# Patient Record
Sex: FEMALE | Race: WHITE | NOT HISPANIC OR LATINO | Employment: STUDENT | URBAN - METROPOLITAN AREA
[De-identification: names, ages, dates, MRNs, and addresses within clinical notes are randomized per-mention and may not be internally consistent; named-entity substitution may affect disease eponyms.]

---

## 2019-05-16 ENCOUNTER — NURSE TRIAGE (OUTPATIENT)
Dept: NURSING | Facility: CLINIC | Age: 24
End: 2019-05-16

## 2019-05-16 ENCOUNTER — OFFICE VISIT (OUTPATIENT)
Dept: URGENT CARE | Facility: URGENT CARE | Age: 24
End: 2019-05-16
Payer: COMMERCIAL

## 2019-05-16 VITALS
OXYGEN SATURATION: 100 % | TEMPERATURE: 98.5 F | DIASTOLIC BLOOD PRESSURE: 79 MMHG | WEIGHT: 165 LBS | HEART RATE: 117 BPM | SYSTOLIC BLOOD PRESSURE: 126 MMHG

## 2019-05-16 DIAGNOSIS — H10.32 ACUTE CONJUNCTIVITIS OF LEFT EYE, UNSPECIFIED ACUTE CONJUNCTIVITIS TYPE: Primary | ICD-10-CM

## 2019-05-16 PROCEDURE — 99203 OFFICE O/P NEW LOW 30 MIN: CPT | Performed by: PHYSICIAN ASSISTANT

## 2019-05-16 RX ORDER — OLOPATADINE HYDROCHLORIDE 1 MG/ML
1 SOLUTION/ DROPS OPHTHALMIC 2 TIMES DAILY
Qty: 5 ML | Refills: 0 | Status: SHIPPED | OUTPATIENT
Start: 2019-05-16 | End: 2019-10-15

## 2019-05-16 RX ORDER — ERYTHROMYCIN 5 MG/G
0.5 OINTMENT OPHTHALMIC 3 TIMES DAILY
Qty: 1 G | Refills: 0 | Status: SHIPPED | OUTPATIENT
Start: 2019-05-16 | End: 2019-10-15

## 2019-05-16 NOTE — TELEPHONE ENCOUNTER
Patient calling stating [facility] was faxing information to [facility]. Stating they were in need of further insurance information.    Writer attempted to reach [facility] at  to clarify, phone line is not working at this time.     Rosmery Hamilton RN  [facility]

## 2019-05-16 NOTE — PATIENT INSTRUCTIONS
This does not look like a typical pinkeye infection.  However, the eyelash procedure is certainly a risk factor for infection.  We will try treating with antibiotic ointment erythromycin.   We will also prescribe allergy type eye drops to help with irritation - Patanol.    Follow up with an eye doctor if no improvement in 2 days.  Be seen immediately by an eye doctor if worsening in any way, including vision changes, swelling, drainage.

## 2019-05-16 NOTE — PROGRESS NOTES
"SUBJECTIVE:   Joao Morales is a 24 year old female presenting with a chief complaint of   Chief Complaint   Patient presents with     Urgent Care     Pt in clinic to have eval for left eye irritation and redness after have eyelash extensions.     Eye Problem     She got eyelash extension at a [facility] in hospitals yesterday at 2:30pm. She developed left eye redness right after the treatment. Redness has continued. It has not gotten any worse. Sometimes the eye is \"irritated and watery.\" No itching. No drainage or mattering. No vision changes. She feels like she is slightly swollen around the eye. No fever. She does wear glasses but no contact lenses.  Does has seasonal allergies but this feels different than her typical eye allergy symptoms.      ROS   See HPI    PMH:  No past medical history on file.  Patient Active Problem List   Diagnosis   (none) - all problems resolved or deleted       Current Medications:  Current Outpatient Medications   Medication Sig Dispense Refill     erythromycin (ROMYCIN) 5 MG/GM ophthalmic ointment Place 0.5 inches Into the left eye 3 times daily for 7 days 1 g 0     olopatadine (PATANOL) 0.1 % ophthalmic solution Place 1 drop Into the left eye 2 times daily 5 mL 0     MINOCYCLINE HCL For acne         Surgical History:  No past surgical history on file.    Family History:  No family history on file.    Social History:  Social History     Tobacco Use     Smoking status: Never Smoker     Smokeless tobacco: Never Used   Substance Use Topics     Alcohol use: Not on file            OBJECTIVE  /79   Pulse 117   Temp 98.5  F (36.9  C) (Oral)   Wt 74.8 kg (165 lb)   SpO2 100%     General: alert, appears well, NAD. Afebrile. Anxious initially.  Skin: no suspicious lesions or rashes.  HEENT: Normocephalic.   Eyes: ;eft eye: conjunctiva injected. No edema, mattering, or drainage. No lid edema or erythema. Right eye: conjunctiva clear.  Neck: supple, no " lymphadenopathy.  Respiratory: No distress.      Labs:  No results found for this or any previous visit (from the past 24 hour(s)).            ASSESSMENT/PLAN:    ICD-10-CM    1. Acute conjunctivitis of left eye, unspecified acute conjunctivitis type H10.32 erythromycin (ROMYCIN) 5 MG/GM ophthalmic ointment     olopatadine (PATANOL) 0.1 % ophthalmic solution           Medical Decision Making:    Serious Comorbid Conditions: none    Differential Diagnosis: bacterial vs allergic vs irritant conjunctivitis    Some risk for bacterial conjunctivitis given recent eyelash treatment, though appears less like this due to lack of drainage or mattering.   More likely allergic or irritant from the procedure.  No vision changes or eye pain- do not think this is corneal abrasion or ulcer.     Will treat with both erythromycin ointment as well as Patanol drops for irritation.  Recommend if no improvement in 2 days, she see ophthalmology- has an eye doctor.    At the end of the encounter, I discussed all available results, as well as the diagnosis and any associated medications. I discussed red flags for immediate return to clinic/ER, as well as indications for follow up. Refer to patient instructions below, which were all addressed with patient. Patient understood and agreed to plan. Patient was appropriate for discharge.      Patient Instructions   This does not look like a typical pinkeye infection.  However, the eyelash procedure is certainly a risk factor for infection.  We will try treating with antibiotic ointment erythromycin.   We will also prescribe allergy type eye drops to help with irritation - Patanol.    Follow up with an eye doctor if no improvement in 2 days.  Be seen immediately by an eye doctor if worsening in any way, including vision changes, swelling, drainage.              Jenn Atkins PA-C

## 2019-10-15 ENCOUNTER — OFFICE VISIT (OUTPATIENT)
Dept: FAMILY MEDICINE | Facility: CLINIC | Age: 24
End: 2019-10-15
Payer: COMMERCIAL

## 2019-10-15 VITALS
SYSTOLIC BLOOD PRESSURE: 112 MMHG | HEART RATE: 104 BPM | DIASTOLIC BLOOD PRESSURE: 72 MMHG | TEMPERATURE: 98.2 F | OXYGEN SATURATION: 100 %

## 2019-10-15 DIAGNOSIS — F90.0 ATTENTION DEFICIT HYPERACTIVITY DISORDER (ADHD), PREDOMINANTLY INATTENTIVE TYPE: ICD-10-CM

## 2019-10-15 DIAGNOSIS — F41.1 GAD (GENERALIZED ANXIETY DISORDER): Primary | ICD-10-CM

## 2019-10-15 DIAGNOSIS — F32.0 MILD MAJOR DEPRESSION (H): ICD-10-CM

## 2019-10-15 DIAGNOSIS — Z23 NEED FOR PROPHYLACTIC VACCINATION AND INOCULATION AGAINST INFLUENZA: ICD-10-CM

## 2019-10-15 PROCEDURE — 99202 OFFICE O/P NEW SF 15 MIN: CPT | Mod: 25 | Performed by: NURSE PRACTITIONER

## 2019-10-15 PROCEDURE — 90686 IIV4 VACC NO PRSV 0.5 ML IM: CPT | Performed by: NURSE PRACTITIONER

## 2019-10-15 PROCEDURE — 90471 IMMUNIZATION ADMIN: CPT | Performed by: NURSE PRACTITIONER

## 2019-10-15 RX ORDER — SERTRALINE HYDROCHLORIDE 100 MG/1
75 TABLET, FILM COATED ORAL DAILY
Refills: 0 | COMMUNITY
Start: 2019-09-30 | End: 2019-10-15

## 2019-10-15 SDOH — HEALTH STABILITY: MENTAL HEALTH: HOW OFTEN DO YOU HAVE A DRINK CONTAINING ALCOHOL?: NEVER

## 2019-10-15 ASSESSMENT — ANXIETY QUESTIONNAIRES
7. FEELING AFRAID AS IF SOMETHING AWFUL MIGHT HAPPEN: SEVERAL DAYS
5. BEING SO RESTLESS THAT IT IS HARD TO SIT STILL: SEVERAL DAYS
3. WORRYING TOO MUCH ABOUT DIFFERENT THINGS: SEVERAL DAYS
GAD7 TOTAL SCORE: 7
6. BECOMING EASILY ANNOYED OR IRRITABLE: SEVERAL DAYS
1. FEELING NERVOUS, ANXIOUS, OR ON EDGE: SEVERAL DAYS
2. NOT BEING ABLE TO STOP OR CONTROL WORRYING: SEVERAL DAYS

## 2019-10-15 ASSESSMENT — PATIENT HEALTH QUESTIONNAIRE - PHQ9
5. POOR APPETITE OR OVEREATING: SEVERAL DAYS
SUM OF ALL RESPONSES TO PHQ QUESTIONS 1-9: 5

## 2019-10-15 NOTE — PROGRESS NOTES
Subjective     Joao Morales is a 24 year old female who presents to clinic today for the following health issues:    HPI   Establish Care   She was in [place] going to school, but moved back due to anxiety and depression.  She started on Zoloft a few months ago and is currently taking 75 mg.  Mild lightheadedness, worse with dose increases.  She did increase from 50 to 100 mg and had side effects, so now is taking 75 mg.    She feels that it has been helping, still having a bit of anxiety.      She does have ADHD and was on Vyvanse in the past.  She has been off for the past year and was not having difficulty at work, but now in the past four months at her new job, is noticing more issues focusing and concentrating.  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             Reviewed and updated as needed this visit by Provider         Review of Systems   ROS COMP: CONSTITUTIONAL: NEGATIVE for fever, chills, change in weight  ENT/MOUTH: NEGATIVE for ear, mouth and throat problems  RESP: NEGATIVE for significant cough or SOB  CV: NEGATIVE for chest pain, palpitations or peripheral edema  GI: NEGATIVE for nausea, abdominal pain, heartburn, or change in bowel habits  MUSCULOSKELETAL: NEGATIVE for significant arthralgias or myalgia  NEURO: NEGATIVE for weakness, dizziness or paresthesias  PSYCHIATRIC: see HPI      Objective    /72   Pulse 104   Temp 98.2  F (36.8  C) (Oral)   LMP 10/09/2019 (Exact Date)   SpO2 100%   There is no height or weight on file to calculate BMI.  Physical Exam   GENERAL: healthy, alert and no distress  PSYCH: mentation appears normal, affect normal/bright; PHQ-9 score of 5; JACLYN-7 score of 7            Assessment & Plan     1. JACLYN (generalized anxiety disorder)  Improved, but not in full remission.  Will try increasing to 100 mg.   Follow up in one month.   - sertraline (ZOLOFT) 50 MG tablet; Take 2 tablets (100 mg) by mouth daily  Dispense: 60 tablet; Refill:  PRN    2. Mild major depression (H)  See above.   - sertraline (ZOLOFT) 50 MG tablet; Take 2 tablets (100 mg) by mouth daily  Dispense: 60 tablet; Refill: PRN    3. Attention deficit hyperactivity disorder (ADHD), predominantly inattentive type  Will first get her anxiety in full remission and if symptoms persist, will follow up and discuss restarting a medication for her ADHD.     4. Need for prophylactic vaccination and inoculation against influenza    - INFLUENZA VACCINE IM > 6 MONTHS VALENT IIV4 [35201]  - Vaccine Administration, Initial [92960]           No follow-ups on file.    Jeannie Gomes, NP  [facility]

## 2019-10-16 ASSESSMENT — ANXIETY QUESTIONNAIRES: GAD7 TOTAL SCORE: 7

## 2019-11-20 ENCOUNTER — OFFICE VISIT (OUTPATIENT)
Dept: FAMILY MEDICINE | Facility: CLINIC | Age: 24
End: 2019-11-20
Payer: COMMERCIAL

## 2019-11-20 VITALS
RESPIRATION RATE: 16 BRPM | WEIGHT: 151.8 LBS | TEMPERATURE: 98.2 F | DIASTOLIC BLOOD PRESSURE: 69 MMHG | SYSTOLIC BLOOD PRESSURE: 110 MMHG | HEART RATE: 77 BPM | OXYGEN SATURATION: 97 %

## 2019-11-20 DIAGNOSIS — R42 DIZZINESS: ICD-10-CM

## 2019-11-20 DIAGNOSIS — F90.9 ATTENTION DEFICIT HYPERACTIVITY DISORDER (ADHD), UNSPECIFIED ADHD TYPE: ICD-10-CM

## 2019-11-20 DIAGNOSIS — F41.1 GAD (GENERALIZED ANXIETY DISORDER): ICD-10-CM

## 2019-11-20 DIAGNOSIS — F32.0 MILD MAJOR DEPRESSION (H): ICD-10-CM

## 2019-11-20 DIAGNOSIS — Z83.2 FAMILY HISTORY OF ANEMIA: Primary | ICD-10-CM

## 2019-11-20 LAB
ERYTHROCYTE [DISTWIDTH] IN BLOOD BY AUTOMATED COUNT: 12.2 % (ref 10–15)
HCT VFR BLD AUTO: 39.8 % (ref 35–47)
HGB BLD-MCNC: 13.2 G/DL (ref 11.7–15.7)
MCH RBC QN AUTO: 29 PG (ref 26.5–33)
MCHC RBC AUTO-ENTMCNC: 33.2 G/DL (ref 31.5–36.5)
MCV RBC AUTO: 88 FL (ref 78–100)
PLATELET # BLD AUTO: 231 10E9/L (ref 150–450)
RBC # BLD AUTO: 4.55 10E12/L (ref 3.8–5.2)
WBC # BLD AUTO: 6.2 10E9/L (ref 4–11)

## 2019-11-20 PROCEDURE — 36415 COLL VENOUS BLD VENIPUNCTURE: CPT | Performed by: FAMILY MEDICINE

## 2019-11-20 PROCEDURE — 85027 COMPLETE CBC AUTOMATED: CPT | Performed by: FAMILY MEDICINE

## 2019-11-20 PROCEDURE — 99214 OFFICE O/P EST MOD 30 MIN: CPT | Performed by: FAMILY MEDICINE

## 2019-11-20 RX ORDER — LISDEXAMFETAMINE DIMESYLATE 10 MG/1
10 CAPSULE ORAL DAILY
Qty: 30 CAPSULE | Refills: 0 | Status: SHIPPED | OUTPATIENT
Start: 2019-11-20 | End: 2019-12-20

## 2019-11-20 RX ORDER — LISDEXAMFETAMINE DIMESYLATE 10 MG/1
10 CAPSULE ORAL DAILY
Qty: 30 CAPSULE | Refills: 0 | Status: SHIPPED | OUTPATIENT
Start: 2019-12-21 | End: 2020-01-20

## 2019-11-20 ASSESSMENT — ANXIETY QUESTIONNAIRES
3. WORRYING TOO MUCH ABOUT DIFFERENT THINGS: SEVERAL DAYS
6. BECOMING EASILY ANNOYED OR IRRITABLE: SEVERAL DAYS
GAD7 TOTAL SCORE: 7
4. TROUBLE RELAXING: SEVERAL DAYS
7. FEELING AFRAID AS IF SOMETHING AWFUL MIGHT HAPPEN: SEVERAL DAYS
7. FEELING AFRAID AS IF SOMETHING AWFUL MIGHT HAPPEN: SEVERAL DAYS
5. BEING SO RESTLESS THAT IT IS HARD TO SIT STILL: SEVERAL DAYS
GAD7 TOTAL SCORE: 7
2. NOT BEING ABLE TO STOP OR CONTROL WORRYING: SEVERAL DAYS
1. FEELING NERVOUS, ANXIOUS, OR ON EDGE: SEVERAL DAYS
GAD7 TOTAL SCORE: 7

## 2019-11-20 ASSESSMENT — PATIENT HEALTH QUESTIONNAIRE - PHQ9
10. IF YOU CHECKED OFF ANY PROBLEMS, HOW DIFFICULT HAVE THESE PROBLEMS MADE IT FOR YOU TO DO YOUR WORK, TAKE CARE OF THINGS AT HOME, OR GET ALONG WITH OTHER PEOPLE: SOMEWHAT DIFFICULT
SUM OF ALL RESPONSES TO PHQ QUESTIONS 1-9: 8
SUM OF ALL RESPONSES TO PHQ QUESTIONS 1-9: 8

## 2019-11-20 NOTE — PROGRESS NOTES
Answers for HPI/ROS submitted by the patient on 11/20/2019   If you checked off any problems, how difficult have these problems made it for you to do your work, take care of things at home, or get along with other people?: Somewhat difficult  PHQ9 TOTAL SCORE: 8  JACLYN 7 TOTAL SCORE: 7

## 2019-11-20 NOTE — PATIENT INSTRUCTIONS
Lab work should be back within next couple days    If your dizziness comes on try the maneuvers as directed on that handout. Okay to flip the directions the other way. This may take 2-3 times of doing these maneuvers consecutively to help with your symptoms    If your symptoms get worse please reach out to us to discuss  --    Decrease your zoloft dose from 100mg to 75mg     --  Vyvanse 10mg in the morning on the days you only need it, try to avoid using on the weekends      Follow-up appointment with myself or Estella Gomes in 2 months

## 2019-11-20 NOTE — PROGRESS NOTES
Subjective:   Joao Morales is a 24 year old female who presents for   Chief Complaint   Patient presents with     Dizziness     off and on X 4 weeks      Feeling off/unstable sometimes possible blurry vision. She had an episode yesterday for about 20 minute.   She is not diabetic. Doesn't typically follow certain activities. No room-spinning.   She was told a month ago that she may BPPV and was given meclizine, this hasn't helped much. She gets headaches occasionally about 1-2x/week but no migraines. No episodes of passing out. She drinks approximately 3-4 bottles of fluids a day. No weakness/numbnes of extremities. Sitting down can improve her symptoms when they start, doesn't take too long to resolve. She has not had any weakness of the body. NO hx of concussions or injuries.     No recent illnesses. No foreign travel.     She did have an adjustment to her zoloft a couple months ago. Her current dose is 100mg after a recent upwards titration from 50mg -> 75mg-> 100mg. At this time the anxiety is more of a concern than her mood/depression. Compared to a month ago she feels that her overall mood is improved from previous.     Of note she did become vegan recently and there is a family history of anemia.     3) Vyvanse - was last on this for ADHD about 1.5 year ago and was prescribed at [facility] in [place] . 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, in a supportive relationship with her female partner, works 5 days a week for her dad (not  [place] stressful job)     FH: none with vertigo    PHQ score of 8, JACLYN score of 7      Patient Active Problem List    Diagnosis Date Noted     Attention deficit hyperactivity disorder (ADHD), predominantly inattentive type 10/15/2019     Priority: Medium     JACLYN (generalized anxiety disorder) 10/15/2019     Priority: Medium     Mild major depression (H) 10/15/2019     Priority: Medium     Current Outpatient Medications   Medication      lisdexamfetamine (VYVANSE) 10 MG capsule     [START ON 12/21/2019] lisdexamfetamine (VYVANSE) 10 MG capsule     sertraline (ZOLOFT) 50 MG tablet     No current facility-administered medications for this visit.      ROS:  As above per HPI    Objective:   /69   Pulse 77   Temp 98.2  F (36.8  C)   Resp 16   Wt 68.9 kg (151 lb 12.8 oz)   SpO2 97% , There is no height or weight on file to calculate BMI.  Gen:  NAD, well-nourished, sitting in chair comfortably  HEENT: EOMI, sclera anicteric, Head normocephalic, ; nares patent; mosit mucous membranes  Neck: trachea midline, no thyromegaly  CV:  Hemodynamically stable, RRR  Pulm:  no increased work of breathing , CTAB, no wheezes/rales/rhonchi   Extrem: no cyanosis, edema or clubbing  Skin: no obvious rashes or abnormalities  Psych: Euthymic, linear thoughts, normal rate of speech  Neuro:  laying down onto right side rapidly may have brought on room spinning towards her left side but no obvious nystagmus on exam. Rombergs negative   MSK: no muscle wasting  Gait: normal/steady    No results found for any visits on 11/20/19.    Assessment & Plan:   Joao Morales, 24 year old female who presents with:  Family history of anemia  Screen hemoglobin today to rule out as the cause of her symptoms.   - CBC with platelets    Mild major depression (H)  JACLYN (generalized anxiety disorder)  Dizziness  Will adjust from 100mg to 75mg to see if this helps with her symptoms of dizziness. She may have a component of BPPV given her symptoms are some times brought on by rapid head movement  - sertraline (ZOLOFT) 50 MG tablet  Dispense: 45 tablet; Refill: 2    Attention deficit hyperactivity disorder (ADHD), unspecified ADHD type  Patient struggling at work, requesting to go back on medication but at a lower dose from previous. Adderrall in the past caused teeth grinding (possible culprit for her root canal) and her mood was 'off' while on this. I provided her two months of 10mg  vyvanse and recommending avoiding on the weekends for her 'drug holidays'  - lisdexamfetamine (VYVANSE) 10 MG capsule  Dispense: 30 capsule; Refill: 0  - lisdexamfetamine (VYVANSE) 10 MG capsule  Dispense: 30 capsule; Refill: 0    She is to return in 2 months for follow-up on her mood/ADHD , sooner if symptoms worsen with any of the above concerns.       Dylan Padgett MD   [facility]    The use of Dragon/[employer]nor[employer] services may have been used to construct the content in this note; any grammatical or spelling errors are non-intentional. Please contact the author of this note directly if you are in need of any clarification.

## 2019-11-21 ASSESSMENT — ANXIETY QUESTIONNAIRES: GAD7 TOTAL SCORE: 7

## 2019-11-21 ASSESSMENT — PATIENT HEALTH QUESTIONNAIRE - PHQ9: SUM OF ALL RESPONSES TO PHQ QUESTIONS 1-9: 8

## 2019-12-13 ENCOUNTER — MYC REFILL (OUTPATIENT)
Dept: FAMILY MEDICINE | Facility: CLINIC | Age: 24
End: 2019-12-13

## 2019-12-13 DIAGNOSIS — F41.1 GAD (GENERALIZED ANXIETY DISORDER): ICD-10-CM

## 2019-12-13 DIAGNOSIS — F32.0 MILD MAJOR DEPRESSION (H): ICD-10-CM

## 2019-12-14 ENCOUNTER — TELEPHONE (OUTPATIENT)
Dept: FAMILY MEDICINE | Facility: CLINIC | Age: 24
End: 2019-12-14

## 2019-12-15 NOTE — TELEPHONE ENCOUNTER
Signed Prescriptions:                        Disp   Refills    sertraline (ZOLOFT) 50 MG tablet           60 tab*1        Sig: Take 2 tablets (100 mg) by mouth daily  Authorizing Provider: DANY MCKINLEY

## 2020-03-01 ENCOUNTER — HEALTH MAINTENANCE LETTER (OUTPATIENT)
Age: 25
End: 2020-03-01

## 2020-05-18 VITALS — WEIGHT: 150 LBS | BODY MASS INDEX: 23.54 KG/M2 | HEIGHT: 67 IN

## 2020-05-18 ASSESSMENT — MIFFLIN-ST. JEOR: SCORE: 1463.03

## 2020-05-18 NOTE — PROGRESS NOTES
"Joao Morales is a 25 year old female who is being evaluated via a billable video visit.      The patient has been notified of following:     \"This video visit will be conducted via a call between you and your physician/provider. We have found that certain health care needs can be provided without the need for an in-person physical exam.  This service lets us provide the care you need with a video conversation.  If a prescription is necessary we can send it directly to your pharmacy.  If lab work is needed we can place an order for that and you can then stop by our lab to have the test done at a later time.    Video visits are billed at different rates depending on your insurance coverage.  Please reach out to your insurance provider with any questions.    If during the course of the call the physician/provider feels a video visit is not appropriate, you will not be charged for this service.\"    Patient has given verbal consent for Video visit? Yes    How would you like to obtain your AVS? [place]    Patient would like the video invitation sent by: Send to e-mail at: [email or web address]    Will anyone else be joining your video visit? No    Subjective     Joao Morales is a 25 year old female who presents today via video visit for the following health issues:    HPI     Allergies -     Onset: always had seasonal allergies      Description:        Nasal congestion: YES         Sneezing: YES        Cough: no        Red, itchy eyes: YES        Wheezing or History of Asthma: no    Intensity: mild    Frequency:        Is it seasonal: in the fall and in the summer          Accompanying Signs & Symptoms:        Fevers: no          Rash: no        Fatigue: no         Sinus/facial pain: YES    Precipitating and/or Alleviating factors:    Symptoms worsen when exposed to: trees, grasses, weeds and outdoors   Has allergy testing been done: no    Therapies tried and outcome: Claritin with  no relief    Would like " medication for congestion            Video Start Time: 7:46    She recently moved back to [place] and would like to have me take over prescribing her Prozac.  She has been on this medication for the past two month.  It is working well for her depression.  She has noticed some difficulty concentrating and fatigue.  She is now working at home, which is a change for her.    She does have ADHD and was on Vyvanse in the past.         Reviewed and updated as needed this visit by Provider         Review of Systems   CONSTITUTIONAL: NEGATIVE for fever, chills, change in weight  ENT/MOUTH: see HPI  RESP: NEGATIVE for significant cough or SOB  CV: NEGATIVE for chest pain, palpitations or peripheral edema  GI: NEGATIVE for nausea, abdominal pain, heartburn, or change in bowel habits  MUSCULOSKELETAL: NEGATIVE for significant arthralgias or myalgia  NEURO: NEGATIVE for weakness, dizziness or paresthesias  PSYCHIATRIC: see HPI      Objective    There were no vitals taken for this visit.  There is no height or weight on file to calculate BMI.  Physical Exam     GENERAL: Healthy, alert and no distress  EYES: Eyes grossly normal to inspection.  No discharge or erythema, or obvious scleral/conjunctival abnormalities.  RESP: No audible wheeze, cough, or visible cyanosis.  No visible retractions or increased work of breathing.    SKIN: Visible skin clear. No significant rash, abnormal pigmentation or lesions.  NEURO: Cranial nerves grossly intact.  Mentation and speech appropriate for age.  PSYCH: Mentation appears normal, affect normal/bright, judgement and insight intact, normal speech and appearance well-groomed.              Assessment & Plan     (F32.0) Mild major depression (H)  (primary encounter diagnosis)  Comment:   Plan: FLUoxetine (PROZAC) 10 MG capsule        Discussed that her difficulty concentrating and fatigue may be depression symptoms or could be her ADHD.  She declines going back on Vyvanse at this time.  Will  increase her Prozac to 30 mg.  Follow up in one month if needed.      (F41.1) JACLYN (generalized anxiety disorder)  Comment:   Plan: FLUoxetine (PROZAC) 10 MG capsule        See above.     (J30.2) Seasonal allergic rhinitis, unspecified trigger  Comment:   Plan: fluticasone (FLONASE) 50 MCG/ACT nasal spray        Will add Flonase.  Discussed the use and indication of this medication as well as potential side effects.  If this is not helpful, she will follow up and will consider adding Singulair.               No follow-ups on file.    Jeannie Gomes NP  [facility]      Video-Visit Details    Type of service:  Video Visit    Video End Time:8:01 AM    Originating Location (pt. Location): Home    Distant Location (provider location):  [facility]     Platform used for Video Visit: [email or web address]    No follow-ups on file.       Jeannie Gomes NP

## 2020-05-19 ENCOUNTER — VIRTUAL VISIT (OUTPATIENT)
Dept: FAMILY MEDICINE | Facility: CLINIC | Age: 25
End: 2020-05-19
Payer: COMMERCIAL

## 2020-05-19 DIAGNOSIS — F41.1 GAD (GENERALIZED ANXIETY DISORDER): ICD-10-CM

## 2020-05-19 DIAGNOSIS — F32.0 MILD MAJOR DEPRESSION (H): Primary | ICD-10-CM

## 2020-05-19 DIAGNOSIS — J30.2 SEASONAL ALLERGIC RHINITIS, UNSPECIFIED TRIGGER: ICD-10-CM

## 2020-05-19 PROCEDURE — 99214 OFFICE O/P EST MOD 30 MIN: CPT | Mod: GT | Performed by: NURSE PRACTITIONER

## 2020-05-19 RX ORDER — FLUOXETINE 10 MG/1
30 CAPSULE ORAL DAILY
Qty: 90 CAPSULE | Refills: 5 | Status: SHIPPED | OUTPATIENT
Start: 2020-05-19 | End: 2020-12-14

## 2020-05-19 RX ORDER — FLUTICASONE PROPIONATE 50 MCG
2 SPRAY, SUSPENSION (ML) NASAL DAILY
Qty: 16 G | Status: SHIPPED | OUTPATIENT
Start: 2020-05-19 | End: 2021-03-04

## 2020-07-21 ENCOUNTER — VIRTUAL VISIT (OUTPATIENT)
Dept: FAMILY MEDICINE | Facility: CLINIC | Age: 25
End: 2020-07-21
Payer: COMMERCIAL

## 2020-07-21 DIAGNOSIS — F32.0 MILD MAJOR DEPRESSION (H): Primary | ICD-10-CM

## 2020-07-21 DIAGNOSIS — F41.1 GAD (GENERALIZED ANXIETY DISORDER): ICD-10-CM

## 2020-07-21 PROCEDURE — 99213 OFFICE O/P EST LOW 20 MIN: CPT | Mod: GT | Performed by: NURSE PRACTITIONER

## 2020-07-21 RX ORDER — FLUOXETINE 40 MG/1
40 CAPSULE ORAL DAILY
Qty: 90 CAPSULE | Refills: 1 | Status: SHIPPED | OUTPATIENT
Start: 2020-07-21 | End: 2020-10-17

## 2020-07-21 ASSESSMENT — ANXIETY QUESTIONNAIRES
5. BEING SO RESTLESS THAT IT IS HARD TO SIT STILL: SEVERAL DAYS
1. FEELING NERVOUS, ANXIOUS, OR ON EDGE: SEVERAL DAYS
7. FEELING AFRAID AS IF SOMETHING AWFUL MIGHT HAPPEN: NOT AT ALL
6. BECOMING EASILY ANNOYED OR IRRITABLE: NOT AT ALL
2. NOT BEING ABLE TO STOP OR CONTROL WORRYING: NOT AT ALL
3. WORRYING TOO MUCH ABOUT DIFFERENT THINGS: SEVERAL DAYS
GAD7 TOTAL SCORE: 4

## 2020-07-21 ASSESSMENT — PATIENT HEALTH QUESTIONNAIRE - PHQ9
5. POOR APPETITE OR OVEREATING: SEVERAL DAYS
SUM OF ALL RESPONSES TO PHQ QUESTIONS 1-9: 4

## 2020-07-21 NOTE — PROGRESS NOTES
"Joao Morales is a 25 year old female who is being evaluated via a billable video visit.      The patient has been notified of following:     \"This video visit will be conducted via a call between you and your physician/provider. We have found that certain health care needs can be provided without the need for an in-person physical exam.  This service lets us provide the care you need with a video conversation.  If a prescription is necessary we can send it directly to your pharmacy.  If lab work is needed we can place an order for that and you can then stop by our lab to have the test done at a later time.    Video visits are billed at different rates depending on your insurance coverage.  Please reach out to your insurance provider with any questions.    If during the course of the call the physician/provider feels a video visit is not appropriate, you will not be charged for this service.\"    Patient has given verbal consent for Video visit? Yes  How would you like to obtain your AVS? MyChart  If you are dropped from the video visit, the video invite should be resent to: [email or web address]   Will anyone else be joining your video visit? No    Subjective     Joao Morales is a 25 year old female who presents today via video visit for the following health issues:    HPI    Depression and Anxiety Follow-Up    How are you doing with your depression since your last visit? Improved since increasing Prozac to 40 MG    How are you doing with your anxiety since your last visit?  Improved     Are you having other symptoms that might be associated with depression or anxiety? No    Have you had a significant life event? No     Do you have any concerns with your use of alcohol or other drugs? No    Social History     Tobacco Use     Smoking status: Never Smoker     Smokeless tobacco: Never Used   Substance Use Topics     Alcohol use: Never     Frequency: Never     Drug use: Never     PHQ 10/15/2019 11/20/2019 7/21/2020   PHQ-9 Total " Score 5 8 4   Q9: Thoughts of better off dead/self-harm past 2 weeks Not at all Not at all Not at all     JACLYN-7 SCORE 10/15/2019 11/20/2019 7/21/2020   Total Score - 7 (mild anxiety) -   Total Score 7 7 4     Last PHQ-9 7/21/2020   1.  Little interest or pleasure in doing things 1   2.  Feeling down, depressed, or hopeless 0   3.  Trouble falling or staying asleep, or sleeping too much 0   4.  Feeling tired or having little energy 1   5.  Poor appetite or overeating 0   6.  Feeling bad about yourself 0   7.  Trouble concentrating 1   8.  Moving slowly or restless 1   Q9: Thoughts of better off dead/self-harm past 2 weeks 0   PHQ-9 Total Score 4     JACLYN-7  7/21/2020   1. Feeling nervous, anxious, or on edge 1   2. Not being able to stop or control worrying 0   3. Worrying too much about different things 1   4. Trouble relaxing 1   5. Being so restless that it is hard to sit still 1   6. Becoming easily annoyed or irritable 0   7. Feeling afraid, as if something awful might happen 0   JACLYN-7 Total Score 4       Suicide Assessment Five-step Evaluation and Treatment (SAFE-T)      How many servings of fruits and vegetables do you eat daily?  2-3    On average, how many sweetened beverages do you drink each day (Examples: soda, juice, sweet tea, etc.  Do NOT count diet or artificially sweetened beverages)? 0    How many days per week do you exercise enough to make your heart beat faster? Daily     How many minutes a day do you exercise enough to make your heart beat faster? 30 minutes     How many days per week do you miss taking your medication? 0         Video Start Time: 12:45    Her dose of fluoxetine was increased to 30 mg in May and increased to 40 mg 2 weeks ago.  She feels that this dose is helpful in controlling her depression and anxiety symptoms.  She denies any medication side effects.          Reviewed and updated as needed this visit by Provider         Review of Systems   CONSTITUTIONAL: NEGATIVE for fever,  chills, change in weight  ENT/MOUTH: NEGATIVE for ear, mouth and throat problems  RESP: NEGATIVE for significant cough or SOB  CV: NEGATIVE for chest pain, palpitations or peripheral edema  GI: NEGATIVE for nausea, abdominal pain, heartburn, or change in bowel habits  MUSCULOSKELETAL: NEGATIVE for significant arthralgias or myalgia  NEURO: NEGATIVE for weakness, dizziness or paresthesias  PSYCHIATRIC: NEGATIVE for changes in mood or affect      Objective             Physical Exam     GENERAL: Healthy, alert and no distress  EYES: Eyes grossly normal to inspection.  No discharge or erythema, or obvious scleral/conjunctival abnormalities.  RESP: No audible wheeze, cough, or visible cyanosis.  No visible retractions or increased work of breathing.    SKIN: Visible skin clear. No significant rash, abnormal pigmentation or lesions.  NEURO: Cranial nerves grossly intact.  Mentation and speech appropriate for age.  PSYCH: Mentation appears normal, affect normal/bright, judgement and insight intact, normal speech and appearance well-groomed.      Diagnostic Test Results:  Labs reviewed in Epic        Assessment & Plan     (F32.0) Mild major depression (H)  (primary encounter diagnosis)  Comment: in complete remisison  Plan: FLUoxetine (PROZAC) 40 MG capsule        The current medical regimen is effective;  continue present plan and medications.   Follow up in 6 months or sooner PRN.     (F41.1) JACLYN (generalized anxiety disorder)  Comment: in complete remission  Plan: FLUoxetine (PROZAC) 40 MG capsule        See above.              No follow-ups on file.    Jeannie Gomes NP  [facility]      Video-Visit Details    Type of service:  Video Visit    Video End Time:12:55 PM    Originating Location (pt. Location): Home    Distant Location (provider location):  [facility]     Platform used for Video Visit: [employer]    No follow-ups on file.       Jeannie Gomes NP

## 2020-07-21 NOTE — PATIENT INSTRUCTIONS
My Depression Action Plan  Name: Joao Morales   Date of Birth 1995  Date: 7/21/2020    My doctor: No Ref-Primary, Physician   My clinic: [facility] [address] [place] [id number] [phone]          GREEN    ZONE   Good Control    What it looks like:     Things are going generally well. You have normal ups and downs. You may even feel depressed from time to time, but bad moods usually last less than a day.   What you need to do:  1. Continue to care for yourself (see self care plan)  2. Check your depression survival kit and update it as needed  3. Follow your physician s recommendations including any medication.  4. Do not stop taking medication unless you consult with your physician first.           YELLOW         ZONE Getting Worse    What it looks like:     Depression is starting to interfere with your life.     It may be hard to get out of bed; you may be starting to isolate yourself from others.    Symptoms of depression are starting to last most all day and this has happened for several days.     You may have suicidal thoughts but they are not constant.   What you need to do:     1. Call your care team. Your response to treatment will improve if you keep your care team informed of your progress. Yellow periods are signs an adjustment may need to be made.     2. Continue your self-care.  Just get dressed and ready for the day.  Don't give yourself time to talk yourself out of it.    3. Talk to someone in your support network.    4. Open up your Depression Self-Care Plan/Wellness Kit.           RED    ZONE Medical Alert - Get Help    What it looks like:     Depression is seriously interfering with your life.     You may experience these or other symptoms: You can t get out of bed most days, can t work or engage in other necessary activities, you have trouble taking care of basic hygiene, or basic responsibilities, thoughts of suicide or death that will not go away,  self-injurious behavior.     What you need to do:  1. Call your care team and request a same-day appointment. If they are not available (weekends or after hours) call your local crisis line, emergency room or 911.            Depression Self-Care Plan / Wellness Kit    Self-Care for Depression  Here s the deal. Your body and mind are really not as separate as most people think.  What you do and think affects how you feel and how you feel influences what you do and think. This means if you do things that people who feel good do, it will help you feel better.  Sometimes this is all it takes.  There is also a place for medication and therapy depending on how severe your depression is, so be sure to consult with your medical provider and/ or Behavioral Health Consultant if your symptoms are worsening or not improving.     In order to better manage my stress, I will:    Exercise  Get some form of exercise, every day. This will help reduce pain and release endorphins, the  feel good  chemicals in your brain. This is almost as good as taking antidepressants!  This is not the same as joining a gym and then never going! (they count on that by the way ) It can be as simple as just going for a walk or doing some gardening, anything that will get you moving.      Hygiene   Maintain good hygiene (get out of bed in the morning, make your bed, brush your teeth, take a shower, and get dressed like you were going to work, even if you are unemployed).  If your clothes don't fit try to get ones that do.    Diet  Strive to eat foods that are good for me, drink plenty of water, and avoid excessive sugar, caffeine, alcohol, and other mood-altering substances.  Some foods that are helpful in depression are: complex carbohydrates, B vitamins, flaxseed, fish or fish oil, fresh fruits and vegetables.    Psychotherapy  Agree to participate in Individual Therapy (if recommended).    Medication  If prescribed medications, I agree to take them.   Missing doses can result in serious side effects.  I understand that drinking alcohol, or other illicit drug use, may cause potential side effects.  I will not stop my medication abruptly without first discussing it with my provider.    Staying Connected With Others  Stay in touch with my friends, family members, and my primary care provider/team.    Use your imagination  Be creative.  We all have a creative side; it doesn t matter if it s oil painting, sand castles, or mud pies! This will also kick up the endorphins.    Witness Beauty  (AKA stop and smell the roses) Take a look outside, even in mid-winter. Notice colors, textures. Watch the squirrels and birds.     Service to others  Be of service to others.  There is always someone else in need.  By helping others we can  get out of ourselves  and remember the really important things.  This also provides opportunities for practicing all the other parts of the program.    Humor  Laugh and be silly!  Adjust your TV habits for less news and crime-drama and more comedy.    Control your stress  Try breathing deep, massage therapy, biofeedback, and meditation. Find time to relax each day.     Crisis Text Line  http://www.crisistextline.org    The Crisis Text Line serves anyone, in any type of crisis, providing access to free, 24/7 support and information via the medium people already use and trust:    Here's how it works:  1.  Text 362-016 from anywhere in the [place], anytime, about any type of crisis.  2.  A live, trained Crisis Counselor receives the text and responds quickly.  3.  The volunteer Crisis Counselor will help you move from a 'hot moment to a cool moment'.    My support system    Clinic Contact:  Phone number:    Contact 1:  Phone number:    Contact 2:  Phone number:    Congregational/:  Phone number:    Therapist:  Phone number:    Local crisis center:    Phone number:    Other community support:  Phone number:

## 2020-07-21 NOTE — NURSING NOTE
Notified pt she is overdue for annual physical with pap smear.   Pt notes she has never had pap smear before. Advised pt to schedule this in the future and to discuss any questions with provider today.     Monica Vee MA

## 2020-07-22 ASSESSMENT — ANXIETY QUESTIONNAIRES: GAD7 TOTAL SCORE: 4

## 2020-10-17 ENCOUNTER — MYC REFILL (OUTPATIENT)
Dept: FAMILY MEDICINE | Facility: CLINIC | Age: 25
End: 2020-10-17

## 2020-10-17 DIAGNOSIS — F41.1 GAD (GENERALIZED ANXIETY DISORDER): ICD-10-CM

## 2020-10-17 DIAGNOSIS — F32.0 MILD MAJOR DEPRESSION (H): ICD-10-CM

## 2020-10-21 RX ORDER — FLUOXETINE 40 MG/1
40 CAPSULE ORAL DAILY
Qty: 90 CAPSULE | Refills: 0 | Status: SHIPPED | OUTPATIENT
Start: 2020-10-21 | End: 2020-12-14

## 2020-12-14 ENCOUNTER — OFFICE VISIT (OUTPATIENT)
Dept: FAMILY MEDICINE | Facility: CLINIC | Age: 25
End: 2020-12-14
Payer: COMMERCIAL

## 2020-12-14 ENCOUNTER — HEALTH MAINTENANCE LETTER (OUTPATIENT)
Age: 25
End: 2020-12-14

## 2020-12-14 VITALS
RESPIRATION RATE: 16 BRPM | BODY MASS INDEX: 22.6 KG/M2 | HEART RATE: 90 BPM | SYSTOLIC BLOOD PRESSURE: 123 MMHG | WEIGHT: 144 LBS | DIASTOLIC BLOOD PRESSURE: 61 MMHG | HEIGHT: 67 IN | OXYGEN SATURATION: 99 % | TEMPERATURE: 99.3 F

## 2020-12-14 DIAGNOSIS — F32.0 MILD MAJOR DEPRESSION (H): ICD-10-CM

## 2020-12-14 DIAGNOSIS — F41.1 GAD (GENERALIZED ANXIETY DISORDER): ICD-10-CM

## 2020-12-14 DIAGNOSIS — R55 VASOVAGAL SYNCOPE: Primary | ICD-10-CM

## 2020-12-14 PROCEDURE — 99214 OFFICE O/P EST MOD 30 MIN: CPT | Performed by: NURSE PRACTITIONER

## 2020-12-14 RX ORDER — FLUOXETINE 40 MG/1
40 CAPSULE ORAL DAILY
Qty: 90 CAPSULE | Refills: 1 | Status: SHIPPED | OUTPATIENT
Start: 2020-12-14 | End: 2021-05-12

## 2020-12-14 ASSESSMENT — MIFFLIN-ST. JEOR: SCORE: 1439.77

## 2020-12-14 NOTE — PATIENT INSTRUCTIONS
Patient Education     Understanding Vasovagal Syncope  Vasovagal syncope is fainting caused by a complex nerve and blood vessel reaction in the body. It's the result of an abnormal reflex in the body and is often called reflex syncope. It s the most common cause of fainting. Unlike other causes of fainting, it s not a sign of a problem with the heart or brain   How to say it  [id number]-lisall  SINK-o-pee  How vasovagal syncope happens  Many nerves connect with your heart and blood vessels. These nerves help control the speed and force of your heartbeat. They also regulate blood pressure. They control whether your blood vessels should be more open or more closed.   Usually these nerves work together so you always get enough blood to your brain. In certain cases, these nerves may give a wrong signal or be slow to respond to input received from the body. This may cause your blood vessels to open wide or cause them not to get more narrow when they need to. At the same time, your heartbeat may slow down. Blood can start to pool in your legs, and not enough of it may reach the brain. If that happens, you may briefly lose consciousness. When you lie down or fall down, blood flow to the brain resumes.   What causes vasovagal syncope?  Many triggers can cause vasovagal syncope, such as:    Standing for long periods    Too much heat    Intense emotion, such as fear    Intense pain    The sight of blood or a needle    Exercising for a long time  Older adults may have additional triggers, such as:    Urinating    Swallowing    Coughing    Having a bowel movement  Symptoms of vasovagal syncope  Fainting is the main symptom of vasovagal syncope. You may have symptoms before fainting such as:     Nausea    Warm, flushed feeling    Face that turns pale    Sweaty palms    Feeling dizzy    Blurred vision  If you lie down and elevate your legs at the first sign of these symptoms, you will often be able to prevent fainting. Not  everyone notices symptoms before fainting, however.   When a person does faint, lying down restores blood flow to the brain. Consciousness should return fairly quickly. You might not feel normal for a little while after you faint. You might feel depressed or fatigued for a short time. You may even feel nauseous afterwards and vomit.   Some people have only 1 or 2 episodes of vasovagal syncope in their life. For others, it happens more often and with no warning.   Diagnosing vasovagal syncope  Your healthcare provider will ask about your health history and your symptoms. He or she will give you a physical exam. Your blood pressure may be measured while lying down, seated, and standing. You may also have an electrocardiogram (ECG). This is a simple test that looks at the heart s rhythm.   You may be checked for other possible causes of fainting. You may have other tests such as:     Continuous portable ECG monitoring, to look at heart rhythms over time, such as with a Holter or event monitor    Echocardiogram, to look at the blood flow in the heart and the heart s motion    Exercise stress testing, to see how your heart does during exercise    Blood tests to check for signs of disease  If these tests are normal, you may have a tilt table test. For this test, you lie down on a platform with straps around you to keep you from sustaining an injury. Your heart rate and blood pressure are measured while you are lying down. The platform is then tilted upright. Your heart rate and blood pressure are measured again. If you have vasovagal syncope, you may faint during the upward tilt. Sometimes medicines that increase heart rate and the force of heart contractions or decrease blood pressure are used to try and provoke a syncopal episode.   There are many causes of syncope. Some causes are not dangerous. In older persons, unexplained syncope can be a sign of a serious infection or a heart attack. Call 911 or seek medical  attention right away to be evaluated, especially if there has been a fall and injury with the syncope. You should not drive yourself to the hospital or emergency department after a syncopal episode for the safety of yourself or other drivers and passengers. Have someone drive you. Your provider may restrict your driving until the cause of the syncope is better understood and to make sure the syncope does not become chronic or if it is unpredictable.   [employer] last reviewed this educational content on 5/1/2019 2000-2020 [employer] [address] [place] [address]. All rights reserved. This information is not intended as a substitute for professional medical care. Always follow your healthcare professional's instructions.

## 2020-12-14 NOTE — PROGRESS NOTES
"Subjective     Joao Morales is a 25 year old female who presents to clinic today for the following health issues:    HPI         Syncope   Onset/Duration: fainted on Friday night. Pt was in a hot tub. Felt dizzy when she got out and then fainted.  Takes Prozac for depression   Description:   Do you feel faint: not now. Feeling weak since fainting episode   Unsteady/off balance: no  Have you passed out or fallen: YES  Progression of Symptoms: improving.  Accompanying Signs & Symptoms:  Heart palpitations or chest pain: no  Nausea, vomiting: no  Weakness or lack of coordination in arms or legs: YES- during episode. Friend helped put pt in a chair   Vision or speech changes: no  Numbness or tingling: no  Ringing in ears (Tinnitus): YES- after patient fainted   History:   Head trauma/concussion history: no  Previous similar symptoms: no  Any new medications (BP?): no  Therapies tried and outcome: None    She had been in the hot tub for over an hour and drinking.   She did eat that day.  She has never fainted in the past.  She denies any chest pain, shortness of breath, dizziness, palpitations, headaches, vision changes.    She is doing well on her current dose of Fluoxetine for anxiety and depression and denies any side effects.         Review of Systems   CONSTITUTIONAL: NEGATIVE for fever, chills, change in weight  ENT/MOUTH: NEGATIVE for ear, mouth and throat problems  RESP: NEGATIVE for significant cough or SOB  CV: NEGATIVE for chest pain, palpitations or peripheral edema  GI: NEGATIVE for nausea, abdominal pain, heartburn, or change in bowel habits  MUSCULOSKELETAL: NEGATIVE for significant arthralgias or myalgia  NEURO: NEGATIVE for weakness, dizziness or paresthesias  PSYCHIATRIC: NEGATIVE for changes in mood or affect      Objective    /61   Pulse 90   Temp 99.3  F (37.4  C) (Oral)   Resp 16   Ht 1.708 m (5' 7.25\")   Wt 65.3 kg (144 lb)   SpO2 99%   BMI 22.39 kg/m    Body mass index is 22.39 " kg/m .  Physical Exam   GENERAL: healthy, alert and no distress  EYES: Eyes grossly normal to inspection, PERRL and conjunctivae and sclerae normal  HENT: ear canals and TM's normal, nose and mouth without ulcers or lesions  NECK: no adenopathy, no asymmetry, masses, or scars and thyroid normal to palpation  RESP: lungs clear to auscultation - no rales, rhonchi or wheezes  CV: regular rate and rhythm, normal S1 S2, no S3 or S4, no murmur, click or rub, no peripheral edema and peripheral pulses strong  ABDOMEN: soft, nontender, no hepatosplenomegaly, no masses and bowel sounds normal  MS: no gross musculoskeletal defects noted, no edema  NEURO: Normal strength and tone, sensory exam grossly normal, mentation intact, cranial nerves 2-12 intact, gait normal including heel/toe/tandem walking, Romberg normal and rapid alternating movements normal  PSYCH: mentation appears normal, affect normal/bright            Assessment & Plan     Vasovagal syncope  Discussed that her symptoms are consistent for vasovagal syncope and there are no red flags, therefore further evaluation (EKG, labs is not necessary).  Discussed prevention in future by limiting time in hot tub, increased fluids, less alcohol.     Mild major depression (H)  In remission  The current medical regimen is effective;  continue present plan and medications.   - FLUoxetine (PROZAC) 40 MG capsule; Take 1 capsule (40 mg) by mouth daily    JACLYN (generalized anxiety disorder)  In remission  - FLUoxetine (PROZAC) 40 MG capsule; Take 1 capsule (40 mg) by mouth daily      The current medical regimen is effective;  continue present plan and medications.       No follow-ups on file.    Jeannie Gomes NP  [facility]

## 2020-12-14 NOTE — PROGRESS NOTES
"Joao Morales is a 25 year old female who is being evaluated via a billable video visit.      The patient has been notified of following:     \"This video visit will be conducted via a call between you and your physician/provider. We have found that certain health care needs can be provided without the need for an in-person physical exam.  This service lets us provide the care you need with a video conversation.  If a prescription is necessary we can send it directly to your pharmacy.  If lab work is needed we can place an order for that and you can then stop by our lab to have the test done at a later time.    Video visits are billed at different rates depending on your insurance coverage.  Please reach out to your insurance provider with any questions.    If during the course of the call the physician/provider feels a video visit is not appropriate, you will not be charged for this service.\"    Patient has given verbal consent for Video visit? {YES-NO  Default Yes:4444::\"Yes\"}  How would you like to obtain your AVS? {AVS Preference:021643}  If you are dropped from the video visit, the video invite should be resent to: {video visit invitation:465079}  Will anyone else be joining your video visit? {:559761}  {If patient encounters technical issues they should call [phone] :319215}    Subjective     Joao Morales is a 25 year old female who presents today via video visit for the following health issues:    HPI     {SUPERLIST (Optional):610036}  {PEDS Chronic and Acute Problems (Optional):114867}     Video Start Time: {video visit start/end time for provider to select:862714}    {additonal problems for provider to add (Optional):375122}    Review of Systems   {ROS COMP (Optional):162342}      Objective           Vitals:  No vitals were obtained today due to virtual visit.    Physical Exam     {video visit exam brief selected:166768::\"GENERAL: Healthy, alert and no distress\",\"EYES: Eyes grossly normal to " "inspection.  No discharge or erythema, or obvious scleral/conjunctival abnormalities.\",\"RESP: No audible wheeze, cough, or visible cyanosis.  No visible retractions or increased work of breathing.  \",\"SKIN: Visible skin clear. No significant rash, abnormal pigmentation or lesions.\",\"NEURO: Cranial nerves grossly intact.  Mentation and speech appropriate for age.\",\"PSYCH: Mentation appears normal, affect normal/bright, judgement and insight intact, normal speech and appearance well-groomed.\"}      {Diagnostic Test Results (Optional):112834}        {PROVIDER CHARTING PREFERENCE:670718}      Video-Visit Details    Type of service:  Video Visit    Video End Time:{video visit start/end time for provider to select:152948}    Originating Location (pt. Location): {video visit patient location:386925::\"Home\"}    Distant Location (provider location):  [facility]     Platform used for Video Visit: {Virtual Visit Platforms:009716::\"CardioVIP\"}          "

## 2021-03-04 ENCOUNTER — VIRTUAL VISIT (OUTPATIENT)
Dept: FAMILY MEDICINE | Facility: CLINIC | Age: 26
End: 2021-03-04
Payer: COMMERCIAL

## 2021-03-04 DIAGNOSIS — F90.9 ATTENTION DEFICIT HYPERACTIVITY DISORDER (ADHD), UNSPECIFIED ADHD TYPE: Primary | ICD-10-CM

## 2021-03-04 PROCEDURE — 99214 OFFICE O/P EST MOD 30 MIN: CPT | Mod: GT | Performed by: NURSE PRACTITIONER

## 2021-03-04 RX ORDER — DEXTROAMPHETAMINE SACCHARATE, AMPHETAMINE ASPARTATE MONOHYDRATE, DEXTROAMPHETAMINE SULFATE AND AMPHETAMINE SULFATE 2.5; 2.5; 2.5; 2.5 MG/1; MG/1; MG/1; MG/1
10 CAPSULE, EXTENDED RELEASE ORAL DAILY
Qty: 30 CAPSULE | Refills: 0 | Status: SHIPPED | OUTPATIENT
Start: 2021-03-04 | End: 2021-04-02

## 2021-03-04 ASSESSMENT — PATIENT HEALTH QUESTIONNAIRE - PHQ9: SUM OF ALL RESPONSES TO PHQ QUESTIONS 1-9: 2

## 2021-03-04 NOTE — PROGRESS NOTES
"Joao is a 26 year old who is being evaluated via a billable video visit.      How would you like to obtain your AVS? MyChart  If the video visit is dropped, the invitation should be resent by: Send to e-mail at: [email or web address]  Will anyone else be joining your video visit? No    Video Start Time: 3:10 PM    Assessment & Plan     (F90.9) Attention deficit hyperactivity disorder (ADHD), unspecified ADHD type  (primary encounter diagnosis)  Comment: uncontrolled  Plan: amphetamine-dextroamphetamine (ADDERALL XR) 10         MG 24 hr capsule        I did do a comprehensive chart review I did see that Jeannie mahajan confirms a diagnosis of ADHD.  Because this patient has not been on medications for a while and she has never tried Adderall, I did approve a 1 month supply of Adderall.    She is to follow-up with Jeannie madrigal in 1 month for a recheck and refills.    In the event that she has any significant side effects or problems I did discuss the controlled substance policy, the importance of taking her medications prescribed, do not overuse or misuse this medication etc.    If she loses her medication refill will not be granted early.  Questions were answered, side effects were reviewed extensively.         See Patient Instructions    Return in about 4 weeks (around 4/1/2021).    MARIA DE JESUS Campos [facility]    Subjective   Joao is a 26 year old who presents via video visit for the following health issues   HPI       Joao was diagnosed with ADHD \"when I was really young.\"  At the end of 2019 she was diagnosed with anxiety and depression.  She has been taking prozac 40mg since the end of 2019.  The fluoxetine has been very helpful for her mood, but it has not helped her ADHD.    She was an approximate 7th grader when she was diagnosed with ADHD.  She was evaluated by Jeannie Mahajan.  She was on vyvanse through high school and college.  When she graduated " "[employer] [employer] (2018) she stopped the vyvanse.  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        Review of Systems   Constitutional, HEENT, cardiovascular, pulmonary, GI, , musculoskeletal, neuro, skin, endocrine and psych systems are negative, except as otherwise noted.      Objective           Vitals:  No vitals were obtained today due to virtual visit.    Physical Exam   GENERAL: Healthy, alert and no distress  EYES: Eyes grossly normal to inspection.  No discharge or erythema, or obvious scleral/conjunctival abnormalities.  RESP: No audible wheeze, cough, or visible cyanosis.  No visible retractions or increased work of breathing.    SKIN: Visible skin clear. No significant rash, abnormal pigmentation or lesions.  NEURO: Cranial nerves grossly intact.  Mentation and speech appropriate for age.  PSYCH: Mentation appears normal, affect normal/bright, judgement and insight intact, normal speech and appearance well-groomed.    Diagnostics:  Reviewed in epic        Video-Visit Details    Type of service:  Video Visit    Video End Time:3:15pm    Originating Location (pt. Location): Home    Distant Location (provider location):  [facility]     Platform used for Video Visit: Sadie  "

## 2021-04-01 ENCOUNTER — MYC REFILL (OUTPATIENT)
Dept: FAMILY MEDICINE | Facility: CLINIC | Age: 26
End: 2021-04-01

## 2021-04-01 DIAGNOSIS — F90.9 ATTENTION DEFICIT HYPERACTIVITY DISORDER (ADHD), UNSPECIFIED ADHD TYPE: ICD-10-CM

## 2021-04-01 RX ORDER — DEXTROAMPHETAMINE SACCHARATE, AMPHETAMINE ASPARTATE MONOHYDRATE, DEXTROAMPHETAMINE SULFATE AND AMPHETAMINE SULFATE 2.5; 2.5; 2.5; 2.5 MG/1; MG/1; MG/1; MG/1
10 CAPSULE, EXTENDED RELEASE ORAL DAILY
Qty: 30 CAPSULE | Refills: 0 | Status: CANCELLED | OUTPATIENT
Start: 2021-04-01

## 2021-04-02 DIAGNOSIS — F90.9 ATTENTION DEFICIT HYPERACTIVITY DISORDER (ADHD), UNSPECIFIED ADHD TYPE: ICD-10-CM

## 2021-04-02 RX ORDER — DEXTROAMPHETAMINE SACCHARATE, AMPHETAMINE ASPARTATE MONOHYDRATE, DEXTROAMPHETAMINE SULFATE AND AMPHETAMINE SULFATE 2.5; 2.5; 2.5; 2.5 MG/1; MG/1; MG/1; MG/1
10 CAPSULE, EXTENDED RELEASE ORAL DAILY
Qty: 5 CAPSULE | Refills: 0 | Status: SHIPPED | OUTPATIENT
Start: 2021-04-02 | End: 2021-04-09 | Stop reason: ALTCHOICE

## 2021-04-02 NOTE — TELEPHONE ENCOUNTER
Patient wondering if her request can be looked at again / by another provider. Writer explained the message below, but patient states she was told to schedule an appointment and that a bridge could be possible. She would prefer not to be without medication throughout the weekend until next Wednesday as it help her regulate and maintain ADHD. Please assist or call patient to explain why this was denied and what to do without medication in the meantime if she is struggling. Thanks!

## 2021-04-02 NOTE — TELEPHONE ENCOUNTER
4/7/21 is the appt she has with PCP.    Will send to Eloisa Hollis- medical director.    KANDIS Padilla

## 2021-04-02 NOTE — TELEPHONE ENCOUNTER
Has a visit schedule for Wednesday, 4/7/2021. Is out of meds, can you refill enough until her visit, 4/7/2021. Sign off on med if ok.  Last filled #30 no refills on 3/4/2021.  Thank you

## 2021-04-02 NOTE — TELEPHONE ENCOUNTER
This can wait to be filled by pcp at apt. Its not an emergency or Lifeth[facility] to be off this med

## 2021-04-02 NOTE — TELEPHONE ENCOUNTER
Sent 5 day supply to cover her until 4/7 visit.  Dr. Eloisa Hollis MD / [facility]

## 2021-04-05 NOTE — TELEPHONE ENCOUNTER
Left message informing patient of bridge refill sent to pharmacy by medical director of clinic.    GIOVANY AdamsN, RN  [facility]

## 2021-04-07 ENCOUNTER — VIRTUAL VISIT (OUTPATIENT)
Dept: FAMILY MEDICINE | Facility: CLINIC | Age: 26
End: 2021-04-07
Payer: COMMERCIAL

## 2021-04-07 DIAGNOSIS — F90.0 ATTENTION DEFICIT HYPERACTIVITY DISORDER (ADHD), PREDOMINANTLY INATTENTIVE TYPE: Primary | ICD-10-CM

## 2021-04-07 PROCEDURE — 99213 OFFICE O/P EST LOW 20 MIN: CPT | Mod: GT | Performed by: NURSE PRACTITIONER

## 2021-04-07 RX ORDER — METHYLPHENIDATE HYDROCHLORIDE 18 MG/1
18 TABLET ORAL EVERY MORNING
Qty: 30 TABLET | Refills: 0 | Status: SHIPPED | OUTPATIENT
Start: 2021-04-07 | End: 2021-04-09 | Stop reason: SINTOL

## 2021-04-07 NOTE — PROGRESS NOTES
Joao is a 26 year old who is being evaluated via a billable video visit.      How would you like to obtain your AVS? MyChart  If the video visit is dropped, the invitation should be resent by: Text to cell phone: na  Will anyone else be joining your video visit? No    Video Start Time: 7:27 AM    Assessment & Plan     Attention deficit hyperactivity disorder (ADHD), predominantly inattentive type  Will try a low dose of Concerta and see if this is helpful without increasing anxiety.  She will do a one month follow up e-visit.  Consider Strattera if this is not tolerated.   - methylphenidate HCl ER (CONCERTA) 18 MG CR tablet; Take 1 tablet (18 mg) by mouth every morning      Return in about 4 weeks (around 5/5/2021).    Jeannie Gomes NP  [facility]   Joao is a 26 year old who presents for the following health issues     HPI   ADHD    Description:   Easily distracted: YES  Short attention span: YES  Trouble following directions: no   Impulsive behavior: no   Trouble completing tasks: YES    Accompanying Signs & Symptoms:        Change in sleep pattern: no  Irritability at certain times of the day: YES  Socially withdrawn: no  Depression symptoms: no  Anxiety symptoms: YES    History:  Caffeine intake: Small  Loss of appetite: no  Healthy diet: YES      She has been on Vyvanse in high school after she was diagnosed with ADHD.  She felt like it lasted too long and did not like it.    She started on Adderall XR 10 mg a month ago.  This increased her anxiety and made her feel less able to focus.    She is on Fluoxetine and overall feels that this is helpful for her depression and anxiety.            Review of Systems         Objective           Vitals:  No vitals were obtained today due to virtual visit.    Physical Exam   GENERAL: Healthy, alert and no distress  EYES: Eyes grossly normal to inspection.  No discharge or erythema, or obvious scleral/conjunctival  abnormalities.  RESP: No audible wheeze, cough, or visible cyanosis.  No visible retractions or increased work of breathing.    SKIN: Visible skin clear. No significant rash, abnormal pigmentation or lesions.  NEURO: Cranial nerves grossly intact.  Mentation and speech appropriate for age.  PSYCH: Mentation appears normal, affect normal/bright, judgement and insight intact, normal speech and appearance well-groomed.                Video-Visit Details    Type of service:  Video Visit    Video End Time:7:41 AM    Originating Location (pt. Location): Home    Distant Location (provider location):  [facility]     Platform used for Video Visit: BLOVES

## 2021-04-17 ENCOUNTER — HEALTH MAINTENANCE LETTER (OUTPATIENT)
Age: 26
End: 2021-04-17

## 2021-05-05 ENCOUNTER — MYC MEDICAL ADVICE (OUTPATIENT)
Dept: FAMILY MEDICINE | Facility: CLINIC | Age: 26
End: 2021-05-05

## 2021-05-06 NOTE — TELEPHONE ENCOUNTER
Writer responded via [employer].    GIOVANY AdamsN, RN  [facility]

## 2021-05-10 ENCOUNTER — MYC MEDICAL ADVICE (OUTPATIENT)
Dept: FAMILY MEDICINE | Facility: CLINIC | Age: 26
End: 2021-05-10

## 2021-05-10 DIAGNOSIS — F90.9 ATTENTION DEFICIT HYPERACTIVITY DISORDER (ADHD), UNSPECIFIED ADHD TYPE: ICD-10-CM

## 2021-05-10 DIAGNOSIS — F41.1 GAD (GENERALIZED ANXIETY DISORDER): ICD-10-CM

## 2021-05-10 DIAGNOSIS — F32.0 MILD MAJOR DEPRESSION (H): ICD-10-CM

## 2021-05-10 RX ORDER — ATOMOXETINE 40 MG/1
40 CAPSULE ORAL DAILY
Qty: 30 CAPSULE | Refills: 0 | Status: SHIPPED | OUTPATIENT
Start: 2021-05-10 | End: 2021-05-12

## 2021-05-10 NOTE — TELEPHONE ENCOUNTER
Barbara - Might patient be able to get a bridge prescription?  My Chart responses have been to patient.  Bertha Gregory RN  [facility]

## 2021-05-10 NOTE — TELEPHONE ENCOUNTER
Finally got patient to schedule appointment for Wednesday at 1000.  Bertha Gregory RN  [facility]

## 2021-05-11 NOTE — TELEPHONE ENCOUNTER
Writer responded via [employer].    GIOVANY AdamsN, RN  [facility]

## 2021-05-12 ENCOUNTER — VIRTUAL VISIT (OUTPATIENT)
Dept: FAMILY MEDICINE | Facility: CLINIC | Age: 26
End: 2021-05-12
Payer: COMMERCIAL

## 2021-05-12 DIAGNOSIS — F32.0 MILD MAJOR DEPRESSION (H): ICD-10-CM

## 2021-05-12 DIAGNOSIS — F41.1 GAD (GENERALIZED ANXIETY DISORDER): ICD-10-CM

## 2021-05-12 DIAGNOSIS — F90.9 ATTENTION DEFICIT HYPERACTIVITY DISORDER (ADHD), UNSPECIFIED ADHD TYPE: ICD-10-CM

## 2021-05-12 PROCEDURE — 99213 OFFICE O/P EST LOW 20 MIN: CPT | Mod: GT | Performed by: NURSE PRACTITIONER

## 2021-05-12 RX ORDER — FLUOXETINE 40 MG/1
40 CAPSULE ORAL DAILY
Qty: 90 CAPSULE | Refills: 1 | Status: SHIPPED | OUTPATIENT
Start: 2021-05-12 | End: 2021-10-18

## 2021-05-12 RX ORDER — ATOMOXETINE 60 MG/1
60 CAPSULE ORAL DAILY
Qty: 30 CAPSULE | Refills: 5 | Status: SHIPPED | OUTPATIENT
Start: 2021-05-12 | End: 2021-10-12

## 2021-05-12 NOTE — PROGRESS NOTES
Joao is a 26 year old who is being evaluated via a billable video visit.      How would you like to obtain your AVS? MyChart  If the video visit is dropped, the invitation should be resent by: Text to cell phone: [phone]  Will anyone else be joining your video visit? No    Video Start Time: 10:03 AM    Assessment & Plan     Attention deficit hyperactivity disorder (ADHD), unspecified ADHD type  Partial improvement  Will increase dose of Strattera to 60 mg daily.  If this dose works well, will follow up in 6 months, otherwise she will message me on MyChart.   - atomoxetine (STRATTERA) 60 MG capsule; Take 1 capsule (60 mg) by mouth daily    Mild major depression (H)  In remission  The current medical regimen is effective;  continue present plan and medications.   - FLUoxetine (PROZAC) 40 MG capsule; Take 1 capsule (40 mg) by mouth daily    JACLYN (generalized anxiety disorder)  Stable  The current medical regimen is effective;  continue present plan and medications.   - FLUoxetine (PROZAC) 40 MG capsule; Take 1 capsule (40 mg) by mouth daily                 No follow-ups on file.    Jeannie Gomes NP  [facility]   Joao is a 26 year old who presents for the following health issues     HPI     Medication Followup of strattera 40mg    Taking Medication as prescribed: yes    Side Effects:  None    Medication Helping Symptoms:  yes   She recently started on Strattera a month ago.  She can have some nausea if she doesn't eat when she takes   It is not having any adverse effect on her anxiety or depression, like what occurred on stimulants.    She is still having some difficulty with focus, feels like there is still some room for improvement.    She is doing well on her current dose of fluoxetine, mood is stable.        Review of Systems         Objective    Vitals - Patient Reported  Weight (Patient Reported): 63.5 kg (140 lb)  Height (Patient Reported): 172.7 cm (5'  "8\")  BMI (Based on Pt Reported Ht/Wt): 21.29        Physical Exam   GENERAL: Healthy, alert and no distress  EYES: Eyes grossly normal to inspection.  No discharge or erythema, or obvious scleral/conjunctival abnormalities.  RESP: No audible wheeze, cough, or visible cyanosis.  No visible retractions or increased work of breathing.    SKIN: Visible skin clear. No significant rash, abnormal pigmentation or lesions.  NEURO: Cranial nerves grossly intact.  Mentation and speech appropriate for age.  PSYCH: Mentation appears normal, affect normal/bright, judgement and insight intact, normal speech and appearance well-groomed.                Video-Visit Details    Type of service:  Video Visit    Video End Time:10:16 AM    Originating Location (pt. Location): Home    Distant Location (provider location):  [facility]     Platform used for Video Visit: Ashlyn"

## 2021-06-07 ENCOUNTER — MYC MEDICAL ADVICE (OUTPATIENT)
Dept: FAMILY MEDICINE | Facility: CLINIC | Age: 26
End: 2021-06-07

## 2021-06-08 NOTE — TELEPHONE ENCOUNTER
Writer responded via [employer].    GIOVANY AdamsN, RN  [facility]

## 2021-07-22 ENCOUNTER — VIRTUAL VISIT (OUTPATIENT)
Dept: FAMILY MEDICINE | Facility: CLINIC | Age: 26
End: 2021-07-22
Payer: COMMERCIAL

## 2021-07-22 DIAGNOSIS — Z11.59 ENCOUNTER FOR HEPATITIS C SCREENING TEST FOR LOW RISK PATIENT: ICD-10-CM

## 2021-07-22 DIAGNOSIS — K12.0 APHTHOUS ULCER OF MOUTH: Primary | ICD-10-CM

## 2021-07-22 PROCEDURE — 99214 OFFICE O/P EST MOD 30 MIN: CPT | Mod: GT | Performed by: FAMILY MEDICINE

## 2021-07-22 NOTE — PROGRESS NOTES
Joao is a 26 year old who is being evaluated via a billable video visit.      How would you like to obtain your AVS? MyChart  If the video visit is dropped, the invitation should be resent by: Text to cell phone: [phone]  Will anyone else be joining your video visit? No     Video Start Time: 11:33 AM    Assessment & Plan     Aphthous ulcer of mouth  Episodes every 2-3 months for the past year lower inner lip  unclear etiology with uncertain prognosis, requires further workup    Not otherwise ill. Recently has had some nasal congestion, sore throat, also notes she is a mouth breather and has allergies, so suspect these are unrelated. In addition, these symptoms have not accompanied other episodes. She is vegan and has not been good about taking regular supplements to get adequate iron and B12. I encouraged her to do so. See pt instructions   She will schedule lab visit for below eval. If another episode of ulcers and no abnormalities based on labs, recommend visit with [facility]--referral provided.     - CBC with platelets and differential  - ESR: Erythrocyte sedimentation rate  - CRP, inflammation  - Vitamin B12  - Folate  - Iron and iron binding capacity  - Ferritin  - SKIN CARE REFERRAL  - HIV Antigen Antibody Combo    Encounter for hepatitis C screening test for low risk patient   will do one time hep C screening while getting above labs  - Hepatitis C Screen Reflex to HCV RNA Quant and Genotype            Patient Instructions       Patient Education     Eating a Vegan Diet  A vegan diet is a type of vegetarian diet. It includes only foods that come from plants. It includes fruits, vegetables, beans, grains, seeds, and nuts. Some vegetarians also eat eggs and dairy foods such as cheese. But a vegan diet includes only plant foods.  Why eat vegan?  Many people choose to be vegans for cultural, social, and Gnosticism reasons. But a vegan diet is also a healthy way to eat. Vegan diets are high in fiber  and can be low in fat. The foods in a vegan diet don t have cholesterol.  Eating a vegan diet can:    Lower your cholesterol levels    Lower your blood pressure    Lower your risk for heart disease    Help you get and stay at a healthy weight    Lower your risk for diabetes    Lower your risk for cancer  Eating a vegan diet can also ease digestive problems such as:    Bowel diseases    Gallstones  Learning to read ingredient labels  To omit animal-based ingredients in a vegan diet, look for labels that say a food is vegan. But this kind of labeling is not on all foods. You will also need to read labels for ingredients. A vegan diet would not include ingredients from animal sources, such as:    Milk    Cream    Buttermilk    Butter    Ghee    Whey    Casein    Lactose    Eggs    Albumin    Fish sauce    Rennet    Cod liver oil  These ingredients may be found in many foods, such as:    Many types of pasta    Bakery foods that contain milk, butter, or eggs    Mayonnaise    Milk chocolate    Some kinds of caramel    Custard or pudding made with milk or eggs    Nougat    Eggnog    Some kinds of salad dressings and sauces    Some packaged foods that are pre-seasoned    Cheese, yogurt, and ice cream made from animal milk  By reading labels, you can often find vegan options for many of these foods.  Getting enough nutrients  A vegan diet may be lower in calories, protein, calcium, iron, vitamin B-12, or zinc. You will need to make food choices carefully to get the nutrients you need and control the portion size. Here are some guidelines for healthy meal planning:    Eat a wide range of foods. This will help you get more of the nutrients you need.    Eat a number of plant proteins throughout the day.    Plan for enough calories each day. Balance the calories you eat with the calories you use each day. Your dietitian or healthcare provider can help you figure out how many calories you need. Also make sure that your calories  come from foods that are rich in protein, vitamins, and minerals.  Carbohydrates should make up 45% to 65% of total calories. A good source is fortified whole-grain products. Limit sugar. Added sugars such as those in sweetened beverages should provide no more than 10% of your total calories.  Protein should make up 10% to 35% of total calories. Protein rich foods include beans, peas, soy products, and unsalted nuts and seeds  Fat should make up 20% to 35% of total calories. Some sources of plant-based fats are: canola, olive oil, safflower, sunflower, walnut, and corn oils  The recommended amount of dietary fiber is 14 g per 1000 calories. A vegan diet should provide plenty of fiber, especially if whole-grain products such as brown rice and whole grain bread are included.  The lists below can help you choose foods that are good sources of nutrients.  Protein    Dried beans, soybeans, and lentils    Tofu (bean curd) and tempeh (cultured soybeans)    Rice, barley, and other whole grains    Nuts and nut butter    Seitan (wheat gluten)    Textured vegetable protein made from soy flour Vitamin B-12    Fortified soy burgers    Fortified soy milk or other nondairy milk    Fortified cereals    Nutritional yeast   Zinc    Canned or dried beans    Lentils and split peas    Wheat germ    Whole-grain breads and cereals    Nuts and nut butters    Pumpkin and sunflower seeds Calcium    Fortified soy milk or other nondairy milk    Tofu processed with calcium sulfate    Leafy, dark-green vegetables    Dried figs    Fortified orange juice and fortified cereals    Sesame seeds    Beans   Iron    Wheat germ    Dried fruits    Nuts and seeds    Whole grain and fortified breads and cereals    Dried beans, lentils, and split peas    Dark green leafy vegetables        Taking supplements  Despite eating a lot of nutrient-rich foods, a vegan diet may still be low in these nutrients:    Vitamin B-12    Calcium    Vitamin D    Omega-3 fatty  acids    Zinc    Iron  Not having enough of these nutrients may put you at risk for certain health problems. Talk with your dietitian about whether you should take vitamin and mineral supplements.  Getting started  Change to a vegan diet slowly. Here are some tips:    Start by eating more grains, beans, vegetables, and fruits.    Make fish, poultry, or meat a side dish. Then slowly cut them out of your diet.    Eat 3 or more servings of vegetables a day. Eat them raw or lightly steamed.    Eat 2 or more servings of fruit a day. Choose whole fruits with the skin on.    Choose a wide range of grains and whole-grain breads and cereals. Eat 6 or more servings of these foods each day.    Start to replace meat by working up to 2 to 3 servings a day of beans, lentils, split peas, tofu, or tempeh.  Working with a dietitian  A registered dietitian (RD) can help you plan a healthy vegan diet. Ask your healthcare provider for a referral to a local dietitian. Or search for a registered dietitian trained in vegetarian eating at the Academy of Nutrition and Dietetics (AND) website.  To learn more  You can learn more about vegan eating and get recipes from these sites:    Vegetarian Nutrition Dietetic Practice Group    Academy of Nutrition and Dietetics (AND)    Vegetarian Resource Group    American Heart Association  [employer] [place] last reviewed this educational content on 7/1/2019 2000-2021 [employer]. All rights reserved. This information is not intended as a substitute for professional medical care. Always follow your healthcare professional's instructions.               Return in about 4 weeks (around 8/19/2021) for Follow up with [facility] if not improving .    Angeline Quinn MD   [facility]    Jorje Shepherd is a 26 year old who presents for the following health issues      HPI     Concern - Pharyngitis, Mouth/Lip Problem  Onset: 1 week ago   Description: painful canker  sores which make it hard to eat and talk, keeps happening every 2-3 months   Intensity: moderate  Progression of Symptoms:  worsening and same  Accompanying Signs & Symptoms: none   Previous history of similar problem: none   Precipitating factors:        Worsened by: none  Alleviating factors:        Improved by: none  Therapies tried and outcome: None         Review of Systems          Objective           Vitals:  No vitals were obtained today due to virtual visit.    Physical Exam   GENERAL: Healthy, alert and no distress  EYES: Eyes grossly normal to inspection.  No discharge or erythema, or obvious scleral/conjunctival abnormalities.  RESP: No audible wheeze, cough, or visible cyanosis.  No visible retractions or increased work of breathing.    SKIN: Visible skin clear. No significant rash, abnormal pigmentation or lesions.  NEURO: Cranial nerves grossly intact.  Mentation and speech appropriate for age.  PSYCH: Mentation appears normal, affect normal/bright, judgement and insight intact, normal speech and appearance well-groomed.             Video-Visit Details    Type of service:  Video Visit    Video End Time:11:47 AM    Originating Location (pt. Location): Home    Distant Location (provider location):  [facility]     Platform used for Video Visit: [employer]

## 2021-07-22 NOTE — PATIENT INSTRUCTIONS
Patient Education     Eating a Vegan Diet  A vegan diet is a type of vegetarian diet. It includes only foods that come from plants. It includes fruits, vegetables, beans, grains, seeds, and nuts. Some vegetarians also eat eggs and dairy foods such as cheese. But a vegan diet includes only plant foods.  Why eat vegan?  Many people choose to be vegans for cultural, social, and Adventist reasons. But a vegan diet is also a healthy way to eat. Vegan diets are high in fiber and can be low in fat. The foods in a vegan diet don t have cholesterol.  Eating a vegan diet can:    Lower your cholesterol levels    Lower your blood pressure    Lower your risk for heart disease    Help you get and stay at a healthy weight    Lower your risk for diabetes    Lower your risk for cancer  Eating a vegan diet can also ease digestive problems such as:    Bowel diseases    Gallstones  Learning to read ingredient labels  To omit animal-based ingredients in a vegan diet, look for labels that say a food is vegan. But this kind of labeling is not on all foods. You will also need to read labels for ingredients. A vegan diet would not include ingredients from animal sources, such as:    Milk    Cream    Buttermilk    Butter    Ghee    Whey    Casein    Lactose    Eggs    Albumin    Fish sauce    Rennet    Cod liver oil  These ingredients may be found in many foods, such as:    Many types of pasta    Bakery foods that contain milk, butter, or eggs    Mayonnaise    Milk chocolate    Some kinds of caramel    Custard or pudding made with milk or eggs    Nougat    Eggnog    Some kinds of salad dressings and sauces    Some packaged foods that are pre-seasoned    Cheese, yogurt, and ice cream made from animal milk  By reading labels, you can often find vegan options for many of these foods.  Getting enough nutrients  A vegan diet may be lower in calories, protein, calcium, iron, vitamin B-12, or zinc. You will need to make food choices carefully to  get the nutrients you need and control the portion size. Here are some guidelines for healthy meal planning:    Eat a wide range of foods. This will help you get more of the nutrients you need.    Eat a number of plant proteins throughout the day.    Plan for enough calories each day. Balance the calories you eat with the calories you use each day. Your dietitian or healthcare provider can help you figure out how many calories you need. Also make sure that your calories come from foods that are rich in protein, vitamins, and minerals.  Carbohydrates should make up 45% to 65% of total calories. A good source is fortified whole-grain products. Limit sugar. Added sugars such as those in sweetened beverages should provide no more than 10% of your total calories.  Protein should make up 10% to 35% of total calories. Protein rich foods include beans, peas, soy products, and unsalted nuts and seeds  Fat should make up 20% to 35% of total calories. Some sources of plant-based fats are: canola, olive oil, safflower, sunflower, walnut, and corn oils  The recommended amount of dietary fiber is 14 g per 1000 calories. A vegan diet should provide plenty of fiber, especially if whole-grain products such as brown rice and whole grain bread are included.  The lists below can help you choose foods that are good sources of nutrients.  Protein    Dried beans, soybeans, and lentils    Tofu (bean curd) and tempeh (cultured soybeans)    Rice, barley, and other whole grains    Nuts and nut butter    Seitan (wheat gluten)    Textured vegetable protein made from soy flour Vitamin B-12    Fortified soy burgers    Fortified soy milk or other nondairy milk    Fortified cereals    Nutritional yeast   Zinc    Canned or dried beans    Lentils and split peas    Wheat germ    Whole-grain breads and cereals    Nuts and nut butters    Pumpkin and sunflower seeds Calcium    Fortified soy milk or other nondairy milk    Tofu processed with calcium  sulfate    Leafy, dark-green vegetables    Dried figs    Fortified orange juice and fortified cereals    Sesame seeds    Beans   Iron    Wheat germ    Dried fruits    Nuts and seeds    Whole grain and fortified breads and cereals    Dried beans, lentils, and split peas    Dark green leafy vegetables        Taking supplements  Despite eating a lot of nutrient-rich foods, a vegan diet may still be low in these nutrients:    Vitamin B-12    Calcium    Vitamin D    Omega-3 fatty acids    Zinc    Iron  Not having enough of these nutrients may put you at risk for certain health problems. Talk with your dietitian about whether you should take vitamin and mineral supplements.  Getting started  Change to a vegan diet slowly. Here are some tips:    Start by eating more grains, beans, vegetables, and fruits.    Make fish, poultry, or meat a side dish. Then slowly cut them out of your diet.    Eat 3 or more servings of vegetables a day. Eat them raw or lightly steamed.    Eat 2 or more servings of fruit a day. Choose whole fruits with the skin on.    Choose a wide range of grains and whole-grain breads and cereals. Eat 6 or more servings of these foods each day.    Start to replace meat by working up to 2 to 3 servings a day of beans, lentils, split peas, tofu, or tempeh.  Working with a dietitian  A registered dietitian (RD) can help you plan a healthy vegan diet. Ask your healthcare provider for a referral to a local dietitian. Or search for a registered dietitian trained in vegetarian eating at the Academy of Nutrition and Dietetics (AND) website.  To learn more  You can learn more about vegan eating and get recipes from these sites:    Vegetarian Nutrition Dietetic Practice Group    Academy of Nutrition and Dietetics (AND)    Vegetarian Resource Group    American Heart Association  4th aspect last reviewed this educational content on 7/1/2019 2000-2021 [employer]. All rights reserved. This information is not  intended as a substitute for professional medical care. Always follow your healthcare professional's instructions.

## 2021-07-29 ENCOUNTER — LAB (OUTPATIENT)
Dept: LAB | Facility: CLINIC | Age: 26
End: 2021-07-29
Payer: COMMERCIAL

## 2021-07-29 DIAGNOSIS — K12.0 APHTHOUS ULCER OF MOUTH: ICD-10-CM

## 2021-07-29 DIAGNOSIS — Z11.59 ENCOUNTER FOR HEPATITIS C SCREENING TEST FOR LOW RISK PATIENT: ICD-10-CM

## 2021-07-29 LAB
BASOPHILS # BLD AUTO: 0 10E3/UL (ref 0–0.2)
BASOPHILS NFR BLD AUTO: 1 %
CRP SERPL-MCNC: <2.9 MG/L (ref 0–8)
EOSINOPHIL # BLD AUTO: 0 10E3/UL (ref 0–0.7)
EOSINOPHIL NFR BLD AUTO: 1 %
ERYTHROCYTE [DISTWIDTH] IN BLOOD BY AUTOMATED COUNT: 12.3 % (ref 10–15)
ERYTHROCYTE [SEDIMENTATION RATE] IN BLOOD BY WESTERGREN METHOD: 6 MM/HR (ref 0–20)
FOLATE SERPL-MCNC: 23.8 NG/ML
HCT VFR BLD AUTO: 36.5 % (ref 35–47)
HGB BLD-MCNC: 12.5 G/DL (ref 11.7–15.7)
IMM GRANULOCYTES # BLD: 0 10E3/UL
IMM GRANULOCYTES NFR BLD: 0 %
LYMPHOCYTES # BLD AUTO: 1.6 10E3/UL (ref 0.8–5.3)
LYMPHOCYTES NFR BLD AUTO: 43 %
MCH RBC QN AUTO: 29.3 PG (ref 26.5–33)
MCHC RBC AUTO-ENTMCNC: 34.2 G/DL (ref 31.5–36.5)
MCV RBC AUTO: 86 FL (ref 78–100)
MONOCYTES # BLD AUTO: 0.4 10E3/UL (ref 0–1.3)
MONOCYTES NFR BLD AUTO: 10 %
NEUTROPHILS # BLD AUTO: 1.7 10E3/UL (ref 1.6–8.3)
NEUTROPHILS NFR BLD AUTO: 46 %
PLATELET # BLD AUTO: 223 10E3/UL (ref 150–450)
RBC # BLD AUTO: 4.27 10E6/UL (ref 3.8–5.2)
VIT B12 SERPL-MCNC: 645 PG/ML (ref 193–986)
WBC # BLD AUTO: 3.7 10E3/UL (ref 4–11)

## 2021-07-29 PROCEDURE — 82746 ASSAY OF FOLIC ACID SERUM: CPT

## 2021-07-29 PROCEDURE — 86140 C-REACTIVE PROTEIN: CPT

## 2021-07-29 PROCEDURE — 82607 VITAMIN B-12: CPT

## 2021-07-29 PROCEDURE — 83550 IRON BINDING TEST: CPT

## 2021-07-29 PROCEDURE — 36415 COLL VENOUS BLD VENIPUNCTURE: CPT

## 2021-07-29 PROCEDURE — 87389 HIV-1 AG W/HIV-1&-2 AB AG IA: CPT

## 2021-07-29 PROCEDURE — 85652 RBC SED RATE AUTOMATED: CPT

## 2021-07-29 PROCEDURE — 86803 HEPATITIS C AB TEST: CPT

## 2021-07-29 PROCEDURE — 85025 COMPLETE CBC W/AUTO DIFF WBC: CPT

## 2021-07-29 PROCEDURE — 82728 ASSAY OF FERRITIN: CPT

## 2021-07-30 LAB
FERRITIN SERPL-MCNC: 60 NG/ML (ref 12–150)
HCV AB SERPL QL IA: NONREACTIVE
HIV 1+2 AB+HIV1 P24 AG SERPL QL IA: NONREACTIVE
IRON SATN MFR SERPL: 29 % (ref 15–46)
IRON SERPL-MCNC: 99 UG/DL (ref 35–180)
TIBC SERPL-MCNC: 344 UG/DL (ref 240–430)

## 2021-08-02 NOTE — RESULT ENCOUNTER NOTE
Excellent! Please call or sent a [employer] message if you have any questions. Angeline Quinn M.D.

## 2021-10-02 ENCOUNTER — HEALTH MAINTENANCE LETTER (OUTPATIENT)
Age: 26
End: 2021-10-02

## 2021-10-19 PROBLEM — F32.9 MAJOR DEPRESSION: Status: ACTIVE | Noted: 2019-10-15

## 2021-11-09 ENCOUNTER — MYC REFILL (OUTPATIENT)
Dept: FAMILY MEDICINE | Facility: CLINIC | Age: 26
End: 2021-11-09
Payer: COMMERCIAL

## 2021-11-09 DIAGNOSIS — F90.9 ATTENTION DEFICIT HYPERACTIVITY DISORDER (ADHD), UNSPECIFIED ADHD TYPE: ICD-10-CM

## 2021-11-09 RX ORDER — ATOMOXETINE 60 MG/1
60 CAPSULE ORAL DAILY
Qty: 30 CAPSULE | Refills: 0 | Status: CANCELLED | OUTPATIENT
Start: 2021-11-09

## 2021-11-11 NOTE — TELEPHONE ENCOUNTER
See 11/10/21 refill encounter.    Writer responded via [employer].    GIOVANY AdamsN, RN  [facility]

## 2021-12-09 ENCOUNTER — MYC REFILL (OUTPATIENT)
Dept: FAMILY MEDICINE | Facility: CLINIC | Age: 26
End: 2021-12-09
Payer: COMMERCIAL

## 2021-12-09 ENCOUNTER — MYC MEDICAL ADVICE (OUTPATIENT)
Dept: FAMILY MEDICINE | Facility: CLINIC | Age: 26
End: 2021-12-09
Payer: COMMERCIAL

## 2021-12-09 DIAGNOSIS — F90.9 ATTENTION DEFICIT HYPERACTIVITY DISORDER (ADHD), UNSPECIFIED ADHD TYPE: ICD-10-CM

## 2021-12-09 RX ORDER — ATOMOXETINE 60 MG/1
60 CAPSULE ORAL DAILY
Qty: 30 CAPSULE | Refills: 0 | Status: CANCELLED | OUTPATIENT
Start: 2021-12-09

## 2021-12-15 DIAGNOSIS — F90.9 ATTENTION DEFICIT HYPERACTIVITY DISORDER (ADHD), UNSPECIFIED ADHD TYPE: ICD-10-CM

## 2021-12-16 RX ORDER — ATOMOXETINE 60 MG/1
CAPSULE ORAL
Qty: 30 CAPSULE | Refills: 0 | OUTPATIENT
Start: 2021-12-16

## 2021-12-16 NOTE — TELEPHONE ENCOUNTER
Duplicate request. Refill sent 12/14/21 to bridge to next appointment on 1/4/22.    Tricia Allen RN  [facility]

## 2022-01-15 ENCOUNTER — MYC MEDICAL ADVICE (OUTPATIENT)
Dept: FAMILY MEDICINE | Facility: CLINIC | Age: 27
End: 2022-01-15
Payer: COMMERCIAL

## 2022-01-15 DIAGNOSIS — F90.9 ATTENTION DEFICIT HYPERACTIVITY DISORDER (ADHD), UNSPECIFIED ADHD TYPE: ICD-10-CM

## 2022-01-15 DIAGNOSIS — F32.0 MILD MAJOR DEPRESSION (H): ICD-10-CM

## 2022-01-15 DIAGNOSIS — F41.1 GAD (GENERALIZED ANXIETY DISORDER): ICD-10-CM

## 2022-01-17 RX ORDER — ATOMOXETINE 60 MG/1
60 CAPSULE ORAL DAILY
Qty: 30 CAPSULE | Refills: 0 | Status: SHIPPED | OUTPATIENT
Start: 2022-01-17 | End: 2022-02-17

## 2022-01-17 RX ORDER — FLUOXETINE 40 MG/1
CAPSULE ORAL
Qty: 30 CAPSULE | Refills: 0 | Status: SHIPPED | OUTPATIENT
Start: 2022-01-17 | End: 2022-02-20

## 2022-02-15 ENCOUNTER — MYC MEDICAL ADVICE (OUTPATIENT)
Dept: FAMILY MEDICINE | Facility: CLINIC | Age: 27
End: 2022-02-15
Payer: COMMERCIAL

## 2022-02-15 DIAGNOSIS — F90.9 ATTENTION DEFICIT HYPERACTIVITY DISORDER (ADHD), UNSPECIFIED ADHD TYPE: ICD-10-CM

## 2022-02-15 NOTE — TELEPHONE ENCOUNTER
Patient is asking for a bridge prescription of Strattera.  Had to cancel appointment today due to COVID exposure.  Patient also canceled appointments on 1/4/22 and 2/8/22.    Routing refill request to provider for review/approval because:  Drug not on the [facility] refill protocol     Last Written Prescription Date:  1/17/22  Last Fill Quantity: 30,  # refills: 0   Last office visit: 12/14/2020 with prescribing provider:  Virtual Visit 7/22/21   Future Office Visit:   Next 5 appointments (look out 90 days)    Mar 29, 2022  5:30 PM  (Arrive by 5:10 PM)  Provider Visit with Jeannie Gomes NP  [facility] ([facility] [address] [place] [address] [phone]

## 2022-02-16 NOTE — TELEPHONE ENCOUNTER
Relayed to pt via [employer].  Betty Norris RN  [facility]

## 2022-02-17 ENCOUNTER — E-VISIT (OUTPATIENT)
Dept: FAMILY MEDICINE | Facility: CLINIC | Age: 27
End: 2022-02-17
Payer: COMMERCIAL

## 2022-02-17 DIAGNOSIS — F41.1 GAD (GENERALIZED ANXIETY DISORDER): ICD-10-CM

## 2022-02-17 DIAGNOSIS — F90.9 ATTENTION DEFICIT HYPERACTIVITY DISORDER (ADHD), UNSPECIFIED ADHD TYPE: ICD-10-CM

## 2022-02-17 DIAGNOSIS — F32.0 MILD MAJOR DEPRESSION (H): ICD-10-CM

## 2022-02-17 PROCEDURE — 99421 OL DIG E/M SVC 5-10 MIN: CPT | Performed by: NURSE PRACTITIONER

## 2022-02-17 RX ORDER — ATOMOXETINE 60 MG/1
60 CAPSULE ORAL DAILY
Qty: 30 CAPSULE | Refills: 0 | Status: SHIPPED | OUTPATIENT
Start: 2022-02-17 | End: 2022-02-20

## 2022-02-17 NOTE — TELEPHONE ENCOUNTER
Routing refill request to provider for review/approval because:   Blood pressure under 140/90 in past 12 months    Request is not 1st request after initial or after a dose change.    Recent (6 mo) or future (30 days)  visit within the authorizing provider's specialty     Last Written Prescription Date:  1/117/22  Last Fill Quantity: 30,  # refills: 0   Last office visit: pt has submitted eVisit today   Future Office Visit:   Next 5 appointments (look out 90 days)    Mar 29, 2022  5:30 PM  (Arrive by 5:10 PM)  Provider Visit with Jeannie Gomes NP  [facility] ([facility] ) [address] [place] [address] [phone]           Betty Norris RN  [facility]

## 2022-02-20 RX ORDER — ATOMOXETINE 60 MG/1
60 CAPSULE ORAL DAILY
Qty: 30 CAPSULE | Refills: 5 | Status: SHIPPED | OUTPATIENT
Start: 2022-02-20 | End: 2022-06-22

## 2022-02-20 RX ORDER — FLUOXETINE 40 MG/1
CAPSULE ORAL
Qty: 30 CAPSULE | Refills: 5 | Status: SHIPPED | OUTPATIENT
Start: 2022-02-20 | End: 2022-06-22

## 2022-05-14 ENCOUNTER — HEALTH MAINTENANCE LETTER (OUTPATIENT)
Age: 27
End: 2022-05-14

## 2022-05-26 ENCOUNTER — MYC MEDICAL ADVICE (OUTPATIENT)
Dept: FAMILY MEDICINE | Facility: CLINIC | Age: 27
End: 2022-05-26
Payer: COMMERCIAL

## 2022-05-27 NOTE — TELEPHONE ENCOUNTER
Annemarie Cárdenas and Lionel contacted for medication refill status.  Reported patient picked up medication, 5/27/2022 already.       Dalton Orellana, BSN, RN  [facility]

## 2022-06-22 ENCOUNTER — VIRTUAL VISIT (OUTPATIENT)
Dept: FAMILY MEDICINE | Facility: CLINIC | Age: 27
End: 2022-06-22
Payer: COMMERCIAL

## 2022-06-22 DIAGNOSIS — F32.0 MILD MAJOR DEPRESSION (H): ICD-10-CM

## 2022-06-22 DIAGNOSIS — F41.1 GAD (GENERALIZED ANXIETY DISORDER): ICD-10-CM

## 2022-06-22 DIAGNOSIS — F90.9 ATTENTION DEFICIT HYPERACTIVITY DISORDER (ADHD), UNSPECIFIED ADHD TYPE: ICD-10-CM

## 2022-06-22 PROCEDURE — 99207 PR NO CHARGE LOS: CPT | Performed by: NURSE PRACTITIONER

## 2022-06-22 RX ORDER — FLUOXETINE 40 MG/1
CAPSULE ORAL
Qty: 30 CAPSULE | Refills: 5 | Status: SHIPPED | OUTPATIENT
Start: 2022-06-22 | End: 2023-02-10

## 2022-06-22 RX ORDER — ATOMOXETINE 60 MG/1
60 CAPSULE ORAL DAILY
Qty: 30 CAPSULE | Refills: 5 | Status: SHIPPED | OUTPATIENT
Start: 2022-06-22 | End: 2023-02-10

## 2022-06-22 ASSESSMENT — ANXIETY QUESTIONNAIRES
1. FEELING NERVOUS, ANXIOUS, OR ON EDGE: SEVERAL DAYS
4. TROUBLE RELAXING: SEVERAL DAYS
GAD7 TOTAL SCORE: 5
7. FEELING AFRAID AS IF SOMETHING AWFUL MIGHT HAPPEN: NOT AT ALL
8. IF YOU CHECKED OFF ANY PROBLEMS, HOW DIFFICULT HAVE THESE MADE IT FOR YOU TO DO YOUR WORK, TAKE CARE OF THINGS AT HOME, OR GET ALONG WITH OTHER PEOPLE?: SOMEWHAT DIFFICULT
GAD7 TOTAL SCORE: 5
7. FEELING AFRAID AS IF SOMETHING AWFUL MIGHT HAPPEN: NOT AT ALL
3. WORRYING TOO MUCH ABOUT DIFFERENT THINGS: SEVERAL DAYS
6. BECOMING EASILY ANNOYED OR IRRITABLE: NOT AT ALL
2. NOT BEING ABLE TO STOP OR CONTROL WORRYING: SEVERAL DAYS
GAD7 TOTAL SCORE: 5
5. BEING SO RESTLESS THAT IT IS HARD TO SIT STILL: SEVERAL DAYS

## 2022-06-22 ASSESSMENT — PATIENT HEALTH QUESTIONNAIRE - PHQ9
SUM OF ALL RESPONSES TO PHQ QUESTIONS 1-9: 5
10. IF YOU CHECKED OFF ANY PROBLEMS, HOW DIFFICULT HAVE THESE PROBLEMS MADE IT FOR YOU TO DO YOUR WORK, TAKE CARE OF THINGS AT HOME, OR GET ALONG WITH OTHER PEOPLE: SOMEWHAT DIFFICULT
SUM OF ALL RESPONSES TO PHQ QUESTIONS 1-9: 5

## 2022-06-22 NOTE — PROGRESS NOTES
Joao is a 27 year old who is being evaluated via a billable video visit.      How would you like to obtain your AVS? [email or web address]  If the video visit is dropped, the invitation should be resent by: Text to cell phone: [phone]   Will anyone else be joining your video visit? No      Tried for 15 minutes to connect on [employer], called twice and left voicemails.

## 2022-07-24 ENCOUNTER — MYC MEDICAL ADVICE (OUTPATIENT)
Dept: FAMILY MEDICINE | Facility: CLINIC | Age: 27
End: 2022-07-24

## 2022-07-28 NOTE — TELEPHONE ENCOUNTER
Writer responded via [employer].    GIOVANY AdamsN, RN  [facility]

## 2023-02-10 ENCOUNTER — OFFICE VISIT (OUTPATIENT)
Dept: FAMILY MEDICINE | Facility: CLINIC | Age: 28
End: 2023-02-10
Payer: COMMERCIAL

## 2023-02-10 VITALS
OXYGEN SATURATION: 100 % | HEART RATE: 90 BPM | SYSTOLIC BLOOD PRESSURE: 129 MMHG | RESPIRATION RATE: 16 BRPM | WEIGHT: 139 LBS | BODY MASS INDEX: 21.82 KG/M2 | TEMPERATURE: 98 F | DIASTOLIC BLOOD PRESSURE: 76 MMHG | HEIGHT: 67 IN

## 2023-02-10 DIAGNOSIS — Z13.220 SCREENING FOR HYPERLIPIDEMIA: ICD-10-CM

## 2023-02-10 DIAGNOSIS — F32.0 MILD MAJOR DEPRESSION (H): ICD-10-CM

## 2023-02-10 DIAGNOSIS — F90.9 ATTENTION DEFICIT HYPERACTIVITY DISORDER (ADHD), UNSPECIFIED ADHD TYPE: ICD-10-CM

## 2023-02-10 DIAGNOSIS — Z87.898 HISTORY OF SUBSTANCE USE DISORDER: ICD-10-CM

## 2023-02-10 DIAGNOSIS — F41.1 GAD (GENERALIZED ANXIETY DISORDER): ICD-10-CM

## 2023-02-10 DIAGNOSIS — Z00.00 ROUTINE GENERAL MEDICAL EXAMINATION AT A HEALTH CARE FACILITY: Primary | ICD-10-CM

## 2023-02-10 DIAGNOSIS — Z78.9 VEGAN: ICD-10-CM

## 2023-02-10 LAB
CHOLEST SERPL-MCNC: 146 MG/DL
ERYTHROCYTE [DISTWIDTH] IN BLOOD BY AUTOMATED COUNT: 11.9 % (ref 10–15)
HCT VFR BLD AUTO: 35.4 % (ref 35–53)
HDLC SERPL-MCNC: 66 MG/DL
HGB BLD-MCNC: 11.8 G/DL (ref 11.7–17.7)
LDLC SERPL CALC-MCNC: 74 MG/DL
MCH RBC QN AUTO: 29.4 PG (ref 26.5–33)
MCHC RBC AUTO-ENTMCNC: 33.3 G/DL (ref 31.5–36.5)
MCV RBC AUTO: 88 FL (ref 78–100)
NONHDLC SERPL-MCNC: 80 MG/DL
PLATELET # BLD AUTO: 230 10E3/UL (ref 150–450)
RBC # BLD AUTO: 4.02 10E6/UL (ref 3.8–5.9)
TRIGL SERPL-MCNC: 30 MG/DL
VIT B12 SERPL-MCNC: 427 PG/ML (ref 232–1245)
WBC # BLD AUTO: 5.8 10E3/UL (ref 4–11)

## 2023-02-10 PROCEDURE — 85027 COMPLETE CBC AUTOMATED: CPT | Performed by: STUDENT IN AN ORGANIZED HEALTH CARE EDUCATION/TRAINING PROGRAM

## 2023-02-10 PROCEDURE — 91313 COVID-19 VACCINE BIVALENT BOOSTER 18+ (MODERNA): CPT | Performed by: STUDENT IN AN ORGANIZED HEALTH CARE EDUCATION/TRAINING PROGRAM

## 2023-02-10 PROCEDURE — 96127 BRIEF EMOTIONAL/BEHAV ASSMT: CPT | Performed by: STUDENT IN AN ORGANIZED HEALTH CARE EDUCATION/TRAINING PROGRAM

## 2023-02-10 PROCEDURE — 36415 COLL VENOUS BLD VENIPUNCTURE: CPT | Performed by: STUDENT IN AN ORGANIZED HEALTH CARE EDUCATION/TRAINING PROGRAM

## 2023-02-10 PROCEDURE — 82607 VITAMIN B-12: CPT | Performed by: STUDENT IN AN ORGANIZED HEALTH CARE EDUCATION/TRAINING PROGRAM

## 2023-02-10 PROCEDURE — 99214 OFFICE O/P EST MOD 30 MIN: CPT | Mod: 25 | Performed by: STUDENT IN AN ORGANIZED HEALTH CARE EDUCATION/TRAINING PROGRAM

## 2023-02-10 PROCEDURE — 99395 PREV VISIT EST AGE 18-39: CPT | Mod: 25 | Performed by: STUDENT IN AN ORGANIZED HEALTH CARE EDUCATION/TRAINING PROGRAM

## 2023-02-10 PROCEDURE — 80061 LIPID PANEL: CPT | Performed by: STUDENT IN AN ORGANIZED HEALTH CARE EDUCATION/TRAINING PROGRAM

## 2023-02-10 PROCEDURE — 0134A COVID-19 VACCINE BIVALENT BOOSTER 18+ (MODERNA): CPT | Performed by: STUDENT IN AN ORGANIZED HEALTH CARE EDUCATION/TRAINING PROGRAM

## 2023-02-10 RX ORDER — ATOMOXETINE 60 MG/1
60 CAPSULE ORAL DAILY
Qty: 30 CAPSULE | Refills: 5 | Status: SHIPPED | OUTPATIENT
Start: 2023-02-10 | End: 2023-08-17

## 2023-02-10 RX ORDER — FLUOXETINE 40 MG/1
CAPSULE ORAL
Qty: 90 CAPSULE | Refills: 3 | Status: SHIPPED | OUTPATIENT
Start: 2023-02-10 | End: 2023-11-29

## 2023-02-10 ASSESSMENT — ENCOUNTER SYMPTOMS
NERVOUS/ANXIOUS: 1
WEAKNESS: 0
HEMATURIA: 0
DIZZINESS: 0
CHILLS: 0
SHORTNESS OF BREATH: 0
BREAST MASS: 0
FEVER: 0
MYALGIAS: 0
CONSTIPATION: 0
SORE THROAT: 0
HEMATOCHEZIA: 0
ARTHRALGIAS: 0
DIARRHEA: 0
FREQUENCY: 0
JOINT SWELLING: 0
DYSURIA: 0
ABDOMINAL PAIN: 0
COUGH: 0
HEARTBURN: 0
PALPITATIONS: 0
PARESTHESIAS: 0
HEADACHES: 0
EYE PAIN: 0
NAUSEA: 0

## 2023-02-10 ASSESSMENT — PAIN SCALES - GENERAL: PAINLEVEL: NO PAIN (0)

## 2023-02-10 ASSESSMENT — PATIENT HEALTH QUESTIONNAIRE - PHQ9
SUM OF ALL RESPONSES TO PHQ QUESTIONS 1-9: 6
10. IF YOU CHECKED OFF ANY PROBLEMS, HOW DIFFICULT HAVE THESE PROBLEMS MADE IT FOR YOU TO DO YOUR WORK, TAKE CARE OF THINGS AT HOME, OR GET ALONG WITH OTHER PEOPLE: SOMEWHAT DIFFICULT
SUM OF ALL RESPONSES TO PHQ QUESTIONS 1-9: 6

## 2023-02-10 NOTE — PROGRESS NOTES
SUBJECTIVE:   CC: Joao is an 28 year old who presents for preventive health visit.   Patient has been advised of split billing requirements and indicates understanding: Yes     Healthy Habits:     Getting at least 3 servings of Calcium per day:  Yes    Bi-annual eye exam:  Yes    Dental care twice a year:  Yes    Sleep apnea or symptoms of sleep apnea:  None    Diet:  Vegetarian/vegan    Frequency of exercise:  6-7 days/week    Duration of exercise:  45-60 minutes    Taking medications regularly:  Yes    Medication side effects:  None    PHQ-2 Total Score: 2    Additional concerns today:  No    Gender-female  Pronouns-they/them    Sober recently  -hx adderall abuse  -no alcohol use    Work-digital marketing, website   Diet-vegan, tofu, veggie patties,   Exercise-kick boxing     LMP: regular, monthly; gets mood swings during period  Pap/hpv-due    Anxiety/depression  -better with sober  -in 12 stop program  -prozac 40mg   -straterra    Today's PHQ-2 Score:   PHQ-2 ( 1999 Pfizer) 2/10/2023   Q1: Little interest or pleasure in doing things 1   Q2: Feeling down, depressed or hopeless 1   PHQ-2 Score 2   Q1: Little interest or pleasure in doing things Several days   Q2: Feeling down, depressed or hopeless Several days   PHQ-2 Score 2     PHQ 3/4/2021 6/22/2022 2/10/2023   PHQ-9 Total Score 2 5 6   Q9: Thoughts of better off dead/self-harm past 2 weeks Not at all Not at all Not at all     JACLYN-7 SCORE 11/20/2019 7/21/2020 6/22/2022   Total Score 7 (mild anxiety) - 5 (mild anxiety)   Total Score 7 4 5     Have you ever done Advance Care Planning? (For example, a Health Directive, POLST, or a discussion with a medical provider or your loved ones about your wishes): No, advance care planning information given to patient to review.  Patient declined advance care planning discussion at this time.    Social History     Tobacco Use     Smoking status: Never     Smokeless tobacco: Never   Substance Use Topics     Alcohol use:  Yes     Alcohol Use 2/10/2023   Prescreen: >3 drinks/day or >7 drinks/week? Not Applicable       Reviewed orders with patient.  Reviewed health maintenance and updated orders accordingly - Yes    Breast Cancer Screening:  Any new diagnosis of family breast, ovarian, or bowel cancer?     FHS-7:   Breast CA Risk Assessment (FHS-7) 2/10/2023   Did any of your first-degree relatives have breast or ovarian cancer? No   Did any of your relatives have bilateral breast cancer? No   Did any man in your family have breast cancer? No   Did any woman in your family have breast and ovarian cancer? Yes   Did any woman in your family have breast cancer before age 50 y? Yes   Do you have 2 or more relatives with breast and/or ovarian cancer? No   Do you have 2 or more relatives with breast and/or bowel cancer? No     Pertinent mammograms are reviewed under the imaging tab.    History of abnormal Pap smear: NO - age 21-29 PAP every 3 years recommended     Reviewed and updated as needed this visit by clinical staff   Tobacco  Allergies  Meds  Problems   Surg Hx           Reviewed and updated as needed this visit by Provider      Problems   Surg Hx          No past medical history on file.   History reviewed. No pertinent surgical history.  OB History   No obstetric history on file.       Review of Systems   Constitutional: Negative for chills and fever.   HENT: Negative for congestion, ear pain, hearing loss and sore throat.    Eyes: Negative for pain and visual disturbance.   Respiratory: Negative for cough and shortness of breath.    Cardiovascular: Negative for chest pain and palpitations.   Gastrointestinal: Negative for abdominal pain, constipation, diarrhea and nausea.   Genitourinary: Negative for dysuria, frequency, genital sores, hematuria and urgency.   Musculoskeletal: Negative for arthralgias, joint swelling and myalgias.   Skin: Negative for rash.   Neurological: Negative for dizziness, weakness and headaches.  "  Psychiatric/Behavioral: The patient is nervous/anxious.        OBJECTIVE:   /76 (BP Location: Right arm, Patient Position: Sitting, Cuff Size: Adult Regular)   Pulse 90   Temp 98  F (36.7  C) (Tympanic)   Resp 16   Ht 1.702 m (5' 7\")   Wt 63 kg (139 lb)   SpO2 100%   BMI 21.77 kg/m       Physical Exam  Constitutional:       General: Joao Morales is not in acute distress.     Appearance: Joao Morales is well-developed.   HENT:      Head: Normocephalic and atraumatic.      Right Ear: Tympanic membrane, ear canal and external ear normal.      Left Ear: Tympanic membrane, ear canal and external ear normal.      Nose: Nose normal.      Mouth/Throat:      Mouth: Mucous membranes are moist.      Pharynx: No oropharyngeal exudate.   Eyes:      Conjunctiva/sclera: Conjunctivae normal.      Pupils: Pupils are equal, round, and reactive to light.   Cardiovascular:      Rate and Rhythm: Normal rate and regular rhythm.      Heart sounds: Normal heart sounds. No murmur heard.  Pulmonary:      Effort: Pulmonary effort is normal.      Breath sounds: No wheezing or rales.   Abdominal:      General: Bowel sounds are normal.      Palpations: Abdomen is soft.      Tenderness: There is no abdominal tenderness.   Genitourinary:     Comments: deferred  Musculoskeletal:      Cervical back: Normal range of motion and neck supple. No rigidity.   Lymphadenopathy:      Cervical: No cervical adenopathy.   Skin:     General: Skin is warm and dry.      Findings: No rash.   Neurological:      General: No focal deficit present.      Mental Status: Joao Morales is alert and oriented to person, place, and time.   Psychiatric:         Mood and Affect: Mood normal.         Behavior: Behavior normal.         Thought Content: Thought content normal.         Judgment: Judgment normal.           ASSESSMENT/PLAN:   Joao was seen today for physical.    Routine general medical examination at a health care facility  Gender-female, " uses they/them pronouns. Considering top surgery. Wears binder currently. Healthy diet and exercise. Vitals WNL. Will get screening lipids today. Due for pap-pt prefers to wait. Discussed contraception options due to mood changes during period-would be good candidate for IUD. Information given-pt will reach out as needed. Covid booster given. Follow up in 1yr.    Mild major depression (H)  JACLYN (generalized anxiety disorder)  Stable, working with therapist. Refilled x 1 year.  -     FLUoxetine (PROZAC) 40 MG capsule; TAKE 1 CAPSULE(40 MG) BY MOUTH DAILY    Attention deficit hyperactivity disorder (ADHD), unspecified ADHD type  Stable. Refilled straterra.  -     atomoxetine (STRATTERA) 60 MG capsule; Take 1 capsule (60 mg) by mouth daily    Screening for hyperlipidemia  -     Lipid panel; Future    Vegan  -     Vitamin B12; Future  -     CBC with platelets; Future    History of substance use disorder  History of adderall abuse. Sober x 11 months. Doing a 12 step program, going well. Has good support. Working with therapist.     Other orders  -     REVIEW OF HEALTH MAINTENANCE PROTOCOL ORDERS  -     COVID-19,PF,MODERNA BIVALENT (18+YRS)      COUNSELING:  Reviewed preventive health counseling, as reflected in patient instructions       Regular exercise       Healthy diet/nutrition       Vision screening       Contraception      Joao Morales reports that Joao Morales has never smoked. Joao Morales has never used smokeless tobacco.      Baldev Dobson, [facility]    Answers for HPI/ROS submitted by the patient on 2/10/2023  If you checked off any problems, how difficult have these problems made it for you to do your work, take care of things at home, or get along with other people?: Somewhat difficult  PHQ9 TOTAL SCORE: 6  Blood in stool: No  heartburn: No  peripheral edema: No  mood changes: No  Skin sensation changes: No  pelvic pain: No  vaginal bleeding: No  vaginal  discharge: No  tenderness: No  breast mass: No  breast discharge: No  impotence: No  penile discharge: No

## 2023-02-10 NOTE — NURSING NOTE
Prior to immunization administration, verified patients identity using patient s name and date of birth. Please see Immunization Activity for additional information.     Screening Questionnaire for Adult Immunization    Are you sick today?   No   Do you have allergies to medications, food, a vaccine component or latex?   No   Have you ever had a serious reaction after receiving a vaccination?   No   Do you have a long-term health problem with heart, lung, kidney, or metabolic disease (e.g., diabetes), asthma, a blood disorder, no spleen, complement component deficiency, a cochlear implant, or a spinal fluid leak?  Are you on long-term aspirin therapy?   No   Do you have cancer, leukemia, HIV/AIDS, or any other immune system problem?   No   Do you have a parent, brother, or sister with an immune system problem?   No   In the past 3 months, have you taken medications that affect  your immune system, such as prednisone, other steroids, or anticancer drugs; drugs for the treatment of rheumatoid arthritis, Crohn s disease, or psoriasis; or have you had radiation treatments?   No   Have you had a seizure, or a brain or other nervous system problem?   No   During the past year, have you received a transfusion of blood or blood    products, or been given immune (gamma) globulin or antiviral drug?   No   For women: Are you pregnant or is there a chance you could become       pregnant during the next month?   No   Have you received any vaccinations in the past 4 weeks?   No     Immunization questionnaire answers were all negative.        Per orders of Dr. Dobson, injection of Monderna given by Paula Sifuentes CMA. Patient instructed to remain in clinic for 15 minutes afterwards, and to report any adverse reaction to me immediately.       Screening performed by Paula Sifuentes CMA on 2/10/2023 at 1:53 PM.

## 2023-02-10 NOTE — PATIENT INSTRUCTIONS
https://www.[facility].org/learn/birth-control    Preventive Health Recommendations  Female Ages 26 - 39  Yearly exam:   See your health care provider every year in order to  Review health changes.   Discuss preventive care.    Review your medicines if you your doctor has prescribed any.    Until age 30: Get a Pap test every three years (more often if you have had an abnormal result).    After age 30: Talk to your doctor about whether you should have a Pap test every 3 years or have a Pap test with HPV screening every 5 years.   You do not need a Pap test if your uterus was removed (hysterectomy) and you have not had cancer.  You should be tested each year for STDs (sexually transmitted diseases), if you're at risk.   Talk to your provider about how often to have your cholesterol checked.  If you are at risk for diabetes, you should have a diabetes test (fasting glucose).  Shots: Get a flu shot each year. Get a tetanus shot every 10 years.   Nutrition:   Eat at least 5 servings of fruits and vegetables each day.  Eat whole-grain bread, whole-wheat pasta and brown rice instead of white grains and rice.  Get adequate Calcium and Vitamin D.     Lifestyle  Exercise at least 150 minutes a week (30 minutes a day, 5 days of the week). This will help you control your weight and prevent disease.  Limit alcohol to one drink per day.  No smoking.   Wear sunscreen to prevent skin cancer.  See your dentist every six months for an exam and cleaning.

## 2023-08-17 ENCOUNTER — OFFICE VISIT (OUTPATIENT)
Dept: FAMILY MEDICINE | Facility: CLINIC | Age: 28
End: 2023-08-17
Payer: COMMERCIAL

## 2023-08-17 VITALS
OXYGEN SATURATION: 100 % | DIASTOLIC BLOOD PRESSURE: 77 MMHG | SYSTOLIC BLOOD PRESSURE: 121 MMHG | RESPIRATION RATE: 16 BRPM | BODY MASS INDEX: 21.4 KG/M2 | HEART RATE: 96 BPM | WEIGHT: 141.2 LBS | HEIGHT: 68 IN | TEMPERATURE: 98 F

## 2023-08-17 DIAGNOSIS — F32.81 PREMENSTRUAL DYSPHORIA: ICD-10-CM

## 2023-08-17 DIAGNOSIS — F90.0 ATTENTION DEFICIT HYPERACTIVITY DISORDER (ADHD), PREDOMINANTLY INATTENTIVE TYPE: ICD-10-CM

## 2023-08-17 DIAGNOSIS — F90.9 ATTENTION DEFICIT HYPERACTIVITY DISORDER (ADHD), UNSPECIFIED ADHD TYPE: Primary | ICD-10-CM

## 2023-08-17 DIAGNOSIS — Z12.4 SCREENING FOR CERVICAL CANCER: ICD-10-CM

## 2023-08-17 PROCEDURE — 99214 OFFICE O/P EST MOD 30 MIN: CPT | Performed by: STUDENT IN AN ORGANIZED HEALTH CARE EDUCATION/TRAINING PROGRAM

## 2023-08-17 RX ORDER — ATOMOXETINE 40 MG/1
80 CAPSULE ORAL DAILY
Qty: 60 CAPSULE | Refills: 2 | Status: SHIPPED | OUTPATIENT
Start: 2023-08-17 | End: 2023-11-29

## 2023-08-17 RX ORDER — ATOMOXETINE 40 MG/1
40 CAPSULE ORAL 2 TIMES DAILY
COMMUNITY
Start: 2023-06-05 | End: 2023-08-17

## 2023-08-17 ASSESSMENT — PATIENT HEALTH QUESTIONNAIRE - PHQ9
SUM OF ALL RESPONSES TO PHQ QUESTIONS 1-9: 8
10. IF YOU CHECKED OFF ANY PROBLEMS, HOW DIFFICULT HAVE THESE PROBLEMS MADE IT FOR YOU TO DO YOUR WORK, TAKE CARE OF THINGS AT HOME, OR GET ALONG WITH OTHER PEOPLE: SOMEWHAT DIFFICULT
SUM OF ALL RESPONSES TO PHQ QUESTIONS 1-9: 8

## 2023-08-17 ASSESSMENT — PAIN SCALES - GENERAL: PAINLEVEL: NO PAIN (0)

## 2023-08-17 NOTE — PATIENT INSTRUCTIONS
ADHD:  -refilled straterra 80mg daily  -yearly in person visits  -evisit every 3 months to get refills/check in    Pre-op within 30 days of physical.    Ob/gyn referral.        Dr. Dobson

## 2023-08-17 NOTE — PROGRESS NOTES
"  Assessment & Plan     Attention deficit hyperactivity disorder (ADHD), unspecified ADHD type  Stable. Unable to take stimulants due to history of stimulant abuse. Transferring care here. 3 month supply given. Jo Annit in 3 months.   - atomoxetine (STRATTERA) 40 MG capsule  Dispense: 60 capsule; Refill: 2    Screening for cervical cancer  Premenstrual dysphoria  Due for pap smear-never been screened before. Has dysphoria with periods-discussed IUD. Will refer to ob/gyn for IUD consultation. If pt decides to pursue IUD, recommend getting pap at same time.  - Ob/Gyn Referral      Having top surgery consultation November.  Advised they need 1 letter of support from therapist.  Make pre-op within 30 days of surgery.      Baldev Dobson DO  [facility]    Jorje Shepherd is a 28 year old, presenting for the following health issues:  Recheck Medication (Med Check )        8/17/2023    11:24 AM   Additional Questions   Roomed by Yojana MORALES       History of Present Illness       Reason for visit:  Need new PCP. Upcoming top surgery and PCP note needed.    Joao Morales eats 2-3 servings of fruits and vegetables daily.Joao Morales consumes 0 sweetened beverage(s) daily.Joao Morales exercises with enough effort to increase Joao Morales's heart rate 30 to 60 minutes per day.  Joao Morales exercises with enough effort to increase Joao Morales's heart rate 6 days per week.   Joao Morales is taking medications regularly.     ADHD  -straterra 80mg daily  -stable    Goes to derm for mica and creams    Top surgery consult November    Dysphoria around periods      Review of Systems   Constitutional, HEENT, cardiovascular, pulmonary, gi and gu systems are negative, except as otherwise noted.      Objective    /77 (BP Location: Right arm, Patient Position: Sitting, Cuff Size: Adult Regular)   Pulse 96   Temp 98  F (36.7  C) (Temporal)   Resp 16   Ht 1.715 m (5' 7.5\")  "  Wt 64 kg (141 lb 3.2 oz)   LMP 07/17/2023 (Approximate)   SpO2 100%   BMI 21.79 kg/m    Body mass index is 21.79 kg/m .    Physical Exam  Constitutional:       General: Joao Morales is not in acute distress.     Appearance: Joao Morales is well-developed.   Skin:     General: Skin is warm and dry.   Neurological:      General: No focal deficit present.      Mental Status: Joao Morales is alert and oriented to person, place, and time.   Psychiatric:         Mood and Affect: Mood normal.         Behavior: Behavior normal.         Thought Content: Thought content normal.         Judgment: Judgment normal.

## 2023-09-05 ENCOUNTER — MYC MEDICAL ADVICE (OUTPATIENT)
Dept: FAMILY MEDICINE | Facility: CLINIC | Age: 28
End: 2023-09-05
Payer: COMMERCIAL

## 2023-09-11 ENCOUNTER — OFFICE VISIT (OUTPATIENT)
Dept: MIDWIFE SERVICES | Facility: CLINIC | Age: 28
End: 2023-09-11
Payer: COMMERCIAL

## 2023-09-11 VITALS
SYSTOLIC BLOOD PRESSURE: 117 MMHG | HEART RATE: 85 BPM | BODY MASS INDEX: 22.38 KG/M2 | WEIGHT: 145 LBS | DIASTOLIC BLOOD PRESSURE: 72 MMHG | TEMPERATURE: 97.3 F

## 2023-09-11 DIAGNOSIS — Z30.430 ENCOUNTER FOR IUD INSERTION: Primary | ICD-10-CM

## 2023-09-11 DIAGNOSIS — Z30.430 ENCOUNTER FOR INSERTION OF INTRAUTERINE CONTRACEPTIVE DEVICE: ICD-10-CM

## 2023-09-11 DIAGNOSIS — Z11.3 SCREEN FOR STD (SEXUALLY TRANSMITTED DISEASE): ICD-10-CM

## 2023-09-11 DIAGNOSIS — Z12.4 ROUTINE CERVICAL SMEAR: ICD-10-CM

## 2023-09-11 LAB — HCG UR QL: NEGATIVE

## 2023-09-11 PROCEDURE — 99202 OFFICE O/P NEW SF 15 MIN: CPT | Mod: 25 | Performed by: ADVANCED PRACTICE MIDWIFE

## 2023-09-11 PROCEDURE — 87491 CHLMYD TRACH DNA AMP PROBE: CPT | Performed by: ADVANCED PRACTICE MIDWIFE

## 2023-09-11 PROCEDURE — 87591 N.GONORRHOEAE DNA AMP PROB: CPT | Performed by: ADVANCED PRACTICE MIDWIFE

## 2023-09-11 PROCEDURE — G0145 SCR C/V CYTO,THINLAYER,RESCR: HCPCS | Performed by: ADVANCED PRACTICE MIDWIFE

## 2023-09-11 PROCEDURE — 81025 URINE PREGNANCY TEST: CPT | Performed by: ADVANCED PRACTICE MIDWIFE

## 2023-09-11 PROCEDURE — 58300 INSERT INTRAUTERINE DEVICE: CPT | Performed by: ADVANCED PRACTICE MIDWIFE

## 2023-09-11 NOTE — PROGRESS NOTES
IUD Insertion:  CONSULT:    Is a pregnancy test required: Yes.  Was it positive or negative?  Negative  Was a consent obtained?  Yes    Subjective: Joao Morales is a 28 year old  presents for IUD and desires Mirena type IUD. They have period dysphoria so goal is to not have periods. Discussed Mirena will do the best job with that. Discussed the other options as well, Kyleena and Chelle. Would not recommend paragard. Discussed if they continue to have periods on the Mirena we can figure out another option. Discussed what to expect afterwards. Discussed would recommend considering replacement of the Mirena between 5-8 years rather than 8 years as the Mirena has best control of bleeding for 5 years. If bleeding returns, replace at that time otherwise replace at 8 years.  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 Likes to read in their spare time.      Patient has been given the opportunity to ask questions about all forms of birth control, including all options appropriate for Joao Morales.     Joao Morales understands they may have the IUD removed at any time. IUD should be removed by a health care provider.    The entire insertion procedure was reviewed with the patient, including care after placement.    Patient's last menstrual period was 2023 (approximate). No allergy to betadine or shellfish. Patient desires STD screening  hCG Urine Qualitative   Date Value Ref Range Status   2023 Negative Negative Final     Comment:     This test is for screening purposes.  Results should be interpreted along with the clinical picture.  Confirmation testing is available if warranted by ordering [id number], HCG Quantitative Pregnancy.         /72   Pulse 85   Temp 97.3  F (36.3  C)   Wt 65.8 kg (145 lb)   LMP 2023 (Approximate)   BMI 22.38 kg/m      Pelvic Exam:   EG/BUS: normal genital architecture without lesions,  erythema or abnormal secretions.   Vagina: moist, pink, rugae with physiologic discharge and secretions  Cervix: parous no lesions and pink, moist, closed, without lesion or CMT  Uterus: anteverted position, mobile, no pain  Adnexa: within normal limits and no masses, nodularity, tenderness    PROCEDURE NOTE: -- IUD Insertion    Reason for Insertion:  period dysphoria     Premedicated with ibuprofen.  Under sterile technique, cervix was visualized with speculum and prepped with Betadine solution swab x 3. Tenaculum was placed for stability. The uterus was gently straightened and sounded to 7.0 cm. IUD prepared for placement, and IUD inserted according to 's instructions without difficulty or significant resitance, and deployed at the fundus. The strings were visualized and trimmed to 3.0 cm from the external os. Tenaculum was removed and hemostasis noted. Speculum removed.  Patient tolerated procedure well.    Lot # [id number]  Exp: 10/01/2025    EBL: minimal    Complications: none    ASSESSMENT:     ICD-10-CM    1. Encounter for IUD insertion  Z30.430 HCG Qual, Urine ([id number])     HCG Qual, Urine ([id number])     levonorgestrel (MIRENA) 52 MG (20 mcg/day) IUD 1 each     INSERTION INTRAUTERINE DEVICE      2. Screen for STD (sexually transmitted disease)  Z11.3 NEISSERIA GONORRHOEA PCR     CHLAMYDIA TRACHOMATIS PCR      3. Routine cervical smear  Z12.4 Pap imaged thin layer screen reflex to HPV if ASCUS - recommend age 25 - 29      4. Encounter for insertion of intrauterine contraceptive device  Z30.430              PLAN:    Given 's handouts, including when to have IUD removed, list of danger s/sx, side effects and follow up recommended. Encouraged condom use for prevention of STD. Back up contraception advised for 7 days if progestin method. Advised to call for any fever, for prolonged or severe pain or bleeding, abnormal vaginal discharge, or unable to palpate strings. They was advised to use  pain medications (ibuprofen) as needed for mild to moderate pain. Advised to follow-up in clinic in 4-6 weeks for IUD string check if unable to find strings or as directed by provider.     MARIA DE JESUS BeltránM

## 2023-09-12 LAB
C TRACH DNA SPEC QL NAA+PROBE: NEGATIVE
N GONORRHOEA DNA SPEC QL NAA+PROBE: NEGATIVE

## 2023-09-14 LAB
BKR LAB AP GYN ADEQUACY: NORMAL
BKR LAB AP GYN INTERPRETATION: NORMAL
BKR LAB AP HPV REFLEX: NORMAL
BKR LAB AP LMP: NORMAL
BKR LAB AP PREVIOUS ABNORMAL: NORMAL
PATH REPORT.COMMENTS IMP SPEC: NORMAL
PATH REPORT.COMMENTS IMP SPEC: NORMAL
PATH REPORT.RELEVANT HX SPEC: NORMAL

## 2023-10-27 ENCOUNTER — OFFICE VISIT (OUTPATIENT)
Dept: FAMILY MEDICINE | Facility: CLINIC | Age: 28
End: 2023-10-27
Payer: COMMERCIAL

## 2023-10-27 ENCOUNTER — DOCUMENTATION ONLY (OUTPATIENT)
Dept: FAMILY MEDICINE | Facility: CLINIC | Age: 28
End: 2023-10-27

## 2023-10-27 VITALS
DIASTOLIC BLOOD PRESSURE: 82 MMHG | SYSTOLIC BLOOD PRESSURE: 122 MMHG | WEIGHT: 139.3 LBS | BODY MASS INDEX: 21.87 KG/M2 | TEMPERATURE: 98.1 F | HEIGHT: 67 IN | RESPIRATION RATE: 18 BRPM | HEART RATE: 98 BPM | OXYGEN SATURATION: 100 %

## 2023-10-27 DIAGNOSIS — F64.9 GENDER DYSPHORIA: Primary | ICD-10-CM

## 2023-10-27 PROCEDURE — 91320 SARSCV2 VAC 30MCG TRS-SUC IM: CPT | Performed by: STUDENT IN AN ORGANIZED HEALTH CARE EDUCATION/TRAINING PROGRAM

## 2023-10-27 PROCEDURE — 90471 IMMUNIZATION ADMIN: CPT | Performed by: STUDENT IN AN ORGANIZED HEALTH CARE EDUCATION/TRAINING PROGRAM

## 2023-10-27 PROCEDURE — 96127 BRIEF EMOTIONAL/BEHAV ASSMT: CPT | Performed by: STUDENT IN AN ORGANIZED HEALTH CARE EDUCATION/TRAINING PROGRAM

## 2023-10-27 PROCEDURE — 99214 OFFICE O/P EST MOD 30 MIN: CPT | Mod: 25 | Performed by: STUDENT IN AN ORGANIZED HEALTH CARE EDUCATION/TRAINING PROGRAM

## 2023-10-27 PROCEDURE — 90686 IIV4 VACC NO PRSV 0.5 ML IM: CPT | Performed by: STUDENT IN AN ORGANIZED HEALTH CARE EDUCATION/TRAINING PROGRAM

## 2023-10-27 PROCEDURE — 90480 ADMN SARSCOV2 VAC 1/ONLY CMP: CPT | Performed by: STUDENT IN AN ORGANIZED HEALTH CARE EDUCATION/TRAINING PROGRAM

## 2023-10-27 RX ORDER — BLOOD SUGAR DIAGNOSTIC
1 STRIP MISCELLANEOUS WEEKLY
Qty: 100 EACH | Refills: 3 | Status: SHIPPED | OUTPATIENT
Start: 2023-10-27

## 2023-10-27 RX ORDER — CONTAINER,EMPTY
EACH MISCELLANEOUS
Qty: 1 EACH | Refills: 11 | Status: SHIPPED | OUTPATIENT
Start: 2023-10-27

## 2023-10-27 RX ORDER — FINASTERIDE 1 MG/1
1 TABLET, FILM COATED ORAL DAILY
Qty: 90 TABLET | Refills: 0 | Status: SHIPPED | OUTPATIENT
Start: 2023-10-27 | End: 2023-11-29

## 2023-10-27 RX ORDER — TESTOSTERONE CYPIONATE 1000 MG/10ML
25 INJECTION, SOLUTION INTRAMUSCULAR
Qty: 10 ML | Refills: 0 | Status: SHIPPED | OUTPATIENT
Start: 2023-10-27 | End: 2023-12-06

## 2023-10-27 RX ORDER — SYRINGE, DISPOSABLE, 1 ML
SYRINGE, EMPTY DISPOSABLE MISCELLANEOUS
Qty: 25 EACH | Refills: 11 | Status: SHIPPED | OUTPATIENT
Start: 2023-10-27

## 2023-10-27 ASSESSMENT — PAIN SCALES - GENERAL: PAINLEVEL: NO PAIN (0)

## 2023-10-27 ASSESSMENT — PATIENT HEALTH QUESTIONNAIRE - PHQ9
SUM OF ALL RESPONSES TO PHQ QUESTIONS 1-9: 9
10. IF YOU CHECKED OFF ANY PROBLEMS, HOW DIFFICULT HAVE THESE PROBLEMS MADE IT FOR YOU TO DO YOUR WORK, TAKE CARE OF THINGS AT HOME, OR GET ALONG WITH OTHER PEOPLE: VERY DIFFICULT
SUM OF ALL RESPONSES TO PHQ QUESTIONS 1-9: 9

## 2023-10-27 NOTE — COMMUNITY RESOURCES LIST (ENGLISH)
10/27/2023   [facility] [employer]  N/A  For questions about this resource list or additional care needs, please contact your primary care clinic or care manager.  Phone: [phone]   Email: N/A   Address: [address] [place] [address]   Hours: N/A        Financial Stability       Rent and mortgage payment assistance  1  [facility] [place] [employer]'s R[employer] Support Fund Distance: 2.82 miles      Phone/Virtual   [address] [address]  Language: English, Hungarian  Hours: Mon - Fri 10:00 AM - 4:00 PM  Fees: Free   Phone: (166) 881-9992 Email: info@[employer].org Website: http://[employer]     2  [facility] Distance: 3.25 miles      In-Person   [address] [place] [address]  Language: English  Hours: Mon - Fri 8:00 AM - 3:00 PM  Fees: Free   Phone: (324) 897-7856 Ext.14 Email: neighborhood@[employer] Website: [email or web address]          Important Numbers & Websites       Emergency Services   911  [facility]   311  Poison Control   (712) 549-7404  Suicide Prevention Lifeline   (405) 804-2428 (TALK)  Child Abuse Hotline   (834) 513-1755 (4-A-Child)  Sexual Assault Hotline   (445) 405-3419 (HOPE)  National Runaway Safeline   (822) 301-3371 (RUNAWAY)  All-Options Talkline   (287) 268-2946  Substance Abuse Referral   (893) 812-7091 (HELP)

## 2023-10-27 NOTE — PROGRESS NOTES
HPI     Non-binary  Never been on T  Interested in options  Does not want to fully transition  Some friends on T-  Biggest hesitation is losing hair; dad is bald, maternal grandfather has hair    Name and pronouns: they/them pronouns  Seeking medication.  Other support people present in visit today:     Gender identity   How do you identify? Non-binary  On a spectrum from very femme to very masculine, where does your identity land?   Trans masculine.    Where do you want your presentation to be?   -hoping to take away gender dysphoria  -feel more embodied  -gain more muscle    Worries  -facial hair  -losing hair    Ok with face structure/shape changing, voice changes    What sex were you assigned at birth? female    Gender history  When did you first recognize that your body and identity didn t match?  -didn't feel right in their body  -dissociative  -10-12 yrs old    What parts of your body or presentation make you feel uncomfortable or cause dysphoria?  -chest     Have you ever seen a healthcare provider for gender-affirming care? No  HRT: NO  Surgeries: considering top surgery    Embodiment goals  My vision for my body includes:   Things I want to stay the same:   Things I want to change:    Desire to have any gender affirming surgery now or in the future? Top surgery    Support Network  Have you shared this part of your identity with anyone?  Yes  Do you have a support network or people in your life who help you feel affirmed?  Yes   Are you out at work, school or home? Yes    Parents, friends, co-workers  Parents don't fully understand completely      Social History     Living environment, Safety  Who lives in your household? 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 Denies  Do you feel threatened or controlled by a partner, ex-partner or anyone in your life?  "Denies    Relationships  Romantic or sexual partners include:  partner (she/her, queer)   Current partners number: 1  Current partners   have sperm? No  able to get pregnant? Yes   Fertility plans? Does not want to be pregnant;  Interest in cryopreservation? possibly    Sexual health  See SOGI section in Epic  How do you describe your sexual or romantic orientation? Queer  Sexual concerns:  No   Hx of sexual abuse:  No   STI History:   Neg  Do you want STI screening today? If yes, do 3 point screening for GC   Pregnancy History:  No obstetric history on file.  Last Pap Smear :  No results found for: \"PAP\"  Abnormal Pap Hx:  None      Mental Health Assessment     ADD, JACLYN age 12    Have you ever felt depressed because your gender identity and body don t match? Yes, recently, harder   Chemical use history   Sober x 1.5yr (adderall, alcohol)  Mental Health diagnosis history JACLYN, ADD  Mental health hospitalizations no  History of suicide attempts  none  Current suicidal thoughts?  none  Self harm   History in past   none   Current   none  Gender therapist?    Yes           2/10/2023     1:07 PM 8/17/2023    10:23 AM 10/27/2023    12:46 PM   PHQ-9 SCORE   PHQ-9 Total Score MyChart 6 (Mild depression) 8 (Mild depression) 9 (Mild depression)   PHQ-9 Total Score 6 8 9         11/20/2019     1:54 PM 7/21/2020    12:36 PM 6/22/2022     3:37 PM   JACLYN-7 SCORE   Total Score 7 (mild anxiety)  5 (mild anxiety)   Total Score 7 4 5       Surgical History   No past surgical history on file.    Problem List     Patient Active Problem List   Diagnosis    Attention deficit hyperactivity disorder (ADHD), predominantly inattentive type    JACLYN (generalized anxiety disorder)    Mild major depression (H)    Ankle injury    Vegan    History of substance use disorder       Past Medical History   No past medical history on file.  No Known Allergies  Current Outpatient Medications   Medication Sig Dispense Refill    Alcohol Swabs (ALCOHOL PADS) " "70 % PADS 1 Pad once a week 100 each 3    finasteride (PROPECIA) 1 MG tablet Take 1 tablet (1 mg) by mouth daily 90 tablet 0    needle, disp, 18G X 1\" MISC Use to draw up weekly injection 25 each 11    Needle, Disp, 25G X 5/8\" MISC To use to inject hormones weekly 25 each 11    Sharps Container MISC To use to dispose of needles for weekly injection 1 each 11    syringe, disposable, (B-D SYRINGE LUER-MARGARITA) 1 ML MISC To use with weekly injections 25 each 11    testosterone cypionate (DEPOTESTOSTERONE) 100 MG/ML injection Inject 0.25 mLs (25 mg) Subcutaneous every 14 days 10 mL 0    atomoxetine (STRATTERA) 40 MG capsule Take 2 capsules (80 mg) by mouth daily 60 capsule 2    FLUoxetine (PROZAC) 40 MG capsule TAKE 1 CAPSULE(40 MG) BY MOUTH DAILY 90 capsule 3    levonorgestrel (MIRENA) 52 MG (20 mcg/day) IUD 1 each by Intrauterine route once      spironolactone (ALDACTONE) 50 MG tablet TAKE 1 TABLET BY MOUTH DAILY WITH A FULL GLASS OF WATER      Sulfacetamide Sodium-Sulfur 10-5 % LIQD WASH AFFECTED AREA ON FACE ONCE DAILY.  LATHER AND LET SIT FOR SEVERAL MINUTES BEFORE RINSING.      tretinoin (RETIN-A) 0.05 % external cream APPLY SMALL AMOUNT TO FACE EVERY OTHER NIGHT, INCREASING TO NIGHTLY. MOISTURIZE AFTER.       History   Smoking Status    Never   Smokeless Tobacco    Never        Family History   History of clots in family (DVT, PE)? No  History of breast cancer? Yes  Maternal grandmother  Mother is healthy and does not have the BRCA gene    No family history on file.         Review of Systems:   Review of Systems   ROS: 10 point ROS neg other than the symptoms noted above in the HPI.         Physical Exam:     Vitals:    10/27/23 1253   BP: 122/82   BP Location: Right arm   Patient Position: Sitting   Cuff Size: Adult Regular   Pulse: 98   Resp: 18   Temp: 98.1  F (36.7  C)   TempSrc: Temporal   SpO2: 100%   Weight: 63.2 kg (139 lb 4.8 oz)   Height: 1.713 m (5' 7.44\")     BMI= Body mass index is 21.53 kg/m .   Vitals " "were reviewed and were normal  GENERAL:: healthy, alert and no distress    Physical Exam  Constitutional:       Appearance: Normal appearance.   HENT:      Mouth/Throat:      Mouth: Mucous membranes are moist.   Eyes:      Extraocular Movements: Extraocular movements intact.      Conjunctiva/sclera: Conjunctivae normal.      Pupils: Pupils are equal, round, and reactive to light.   Pulmonary:      Effort: Pulmonary effort is normal.   Neurological:      General: No focal deficit present.      Mental Status: Joao Morales is alert and oriented to person, place, and time.   Psychiatric:         Mood and Affect: Mood normal.         Assessment and Plan      Joao is a 28 year old individual that uses  pronouns They/Them/Their/Theirs that presents today for interest in masculinizing treatment to better align their body with their gender identity.      Assessment & Plan   Diagnoses and all orders for this visit:    Gender dysphoria  Non-binary, uses they/them pronouns. Interested in more masculine features. Does NOT want to fully transition-does not want facial hair or to lose their hair. Ok with other changes. Discussed T options, timeline for effects, and potential side effects. All questions answered. Mental health stable-working with therapist. Getting top surgery evaluation soon.    Plan:  -low dose T injection, 25mg weekly  -finasteride 1mg daily to prevent hair loss  -will schedule injection teaching  -follow up in 1 month    Pt is on spironolactone 50mg for acne. Will discuss with colleagues whether this will affect T levels or not.    -     syringe, disposable, (B-D SYRINGE LUER-MARGARITA) 1 ML MISC; To use with weekly injections  -     Alcohol Swabs (ALCOHOL PADS) 70 % PADS; 1 Pad once a week  -     needle, disp, 18G X 1\" MISC; Use to draw up weekly injection  -     Sharps Container MISC; To use to dispose of needles for weekly injection  -     Needle, Disp, 25G X 5/8\" MISC; To use to inject hormones weekly  -     " testosterone cypionate (DEPOTESTOSTERONE) 100 MG/ML injection; Inject 0.25 mLs (25 mg) Subcutaneous every 14 days  -     finasteride (PROPECIA) 1 MG tablet; Take 1 tablet (1 mg) by mouth daily    Other orders  -     INFLUENZA VACCINE IM > 6 MONTHS VALENT IIV4 (AFLURIA/FLUZONE)  -     COVID-19 12+ (2023-24) (PFIZER)      Plan for  work up for gender affirmative hormone therapy :     Educated pt about consultant role in a residency clinic. Educated about 3 parts of evaluation:  Physical health  Mental Health  Informed consent    Medical safety for hormones  Lacks contraindications to hormonal therapy    Counseling  Contraception discussed:  Yes  IUD  Educated about testosterone as absolute contraindication in pregnancy: Yes   Fertility plan if starts gender affirmative hormone therapy : declines to get pregnant    Mental health assessment   Completed today by me   Will be completed next visit  Will be completed in conjunction with existing therapist  Referral placed for completion of mental health assessment  Diagnoses are stable and/or are likely to become stabilized by treatment of gender dysphoria      Informed consent process  Yes Is able to provide informed consent   Yes Likely to take hormones in a responsible manner  Yes Discussed physical effects, benefits, and risk assessment & modification  Yes Discussed the clinical and biochemical monitoring of hormonal changes and the potential impact on reproductive health & fertility      Baldev Dobson, DO             Answers submitted by the patient for this visit:  Patient Health Questionnaire (Submitted on 10/27/2023)  If you checked off any problems, how difficult have these problems made it for you to do your work, take care of things at home, or get along with other people?: Very difficult  PHQ9 TOTAL SCORE: 9  General Questionnaire (Submitted on 10/27/2023)  Chief Complaint: Chronic problems general questions HPI Form  What is the reason for your visit  today? : information about testosterone  How many servings of fruits and vegetables do you eat daily?: 2-3  On average, how many sweetened beverages do you drink each day (Examples: soda, juice, sweet tea, etc.  Do NOT count diet or artificially sweetened beverages)?: 0  How many minutes a day do you exercise enough to make your heart beat faster?: 30 to 60  How many days a week do you exercise enough to make your heart beat faster?: 7  How many days per week do you miss taking your medication?: 0

## 2023-10-27 NOTE — PROGRESS NOTES
Pt needs injection teaching for testosterone.  Can you call them and schedule this with a nurse? All supplies sent to [facility].    Dr. Dobson

## 2023-10-27 NOTE — PATIENT INSTRUCTIONS
Effects of Masculinizing Hormone Therapy: When They Happen      *if you have a uterus, you can get pregnant while on masculinizing hormones. Masculinizing hormones are not birth control, and they can affect the development of the fetus. If you are trying to get pregnant, you should stop masculinizing hormones and can choose to start therapy again at a later time. The effect of masculinizing hormone therapy on ovaries varies from person to person and is unknown.  **it may be possible to prevent and treat scalp hair loss       Informed Consent Form for Masculinizing Medication (Testosterone)      This form refers to the use of testosterone by persons who wish to masculinize their body. While there are risks associated with taking testosterone, when appropriately prescribed it can greatly improve mental health and quality of life. Please seek another opinion if you want additional perspective on any aspect of your care.    Masculinizing Effects    I understand that testosterone may be prescribed to masculinize my body.    2.   I understand that the masculinizing effects of testosterone can take several months to a year to become noticeable, that the rate and degree of change can t be predicted and is different for every person, and that changes may not be complete for 2-5 years after I start testosterone.    3.   I understand that these changes will likely be permanent even if I stop taking testosterone:  Lower voice pitch (i.e., voice becoming deeper).  Increased growth of hair, with thicker/coarser hairs, on arms, legs, chest, back, and abdomen.  Gradual growth of moustache/facial hair.  Hair loss at the temples and crown of the head, with the possibility of becoming completely bald.  Genital changes may or may not be permanent if testosterone is stopped. These include clitoral growth (typically 1-3 cm) and vaginal dryness.    4.   I understand that the following changes are usually not permanent (that is,  they will likely reverse if I stop taking testosterone).  Acne, which may be severe and can cause permanent scarring if not treated.   Fat may redistribute to a more masculine pattern (decreased on buttocks/hips/thighs, increased in abdomen - changing from  pear shape  to  apple shape ).  Increased muscle mass and upper body strength.  Increased libido (sex drive).  Menstrual periods typically stop within 3-6 months of starting testosterone.    5.   I understand that it is not known what the effects of testosterone are on fertility.   Fertility is reduced and I may never be able to get pregnant, even if I stop testosterone.   On the other hand, even if my period stops, it may still be possible for me to get pregnant, and I am aware of birth control options (if applicable).   I have been informed that I CANNOT take testosterone if I am pregnant.   I should consider egg banking if I desire biological children.     6. I understand that there are some aspects of my body that will not be changed by testosterone:  Breasts may appear slightly smaller due to fat loss, but will not substantially shrink.  Although voice pitch will likely drop, other aspects of speech will not become more masculine.      Risks of Testosterone    I understand that the medical effects and safety of testosterone are not fully understood, and that there may be long-term risks that are not yet known.    2.   I understand that I am strongly advised not to take more testosterone than I am prescribed, as this increases health risks. I have been informed that taking more will not make masculinization happen more quickly or increase the degree of change.    3.   I understand that testosterone can cause changes that increase my risk of heart disease, including:  decreasing good cholesterol (HDL) and increasing bad cholesterol (LDL)  increasing blood pressure  increasing deposits of fat around my internal organs    4.   I have been advised that my risks  of heart disease are greater if people in my family have had heart disease, if I am overweight, or if I smoke.    5.   I have been advised that heart health checkups, including monitoring of my weight and cholesterol levels, should be done periodically as long as I am taking testosterone.    6.   I understand that testosterone can damage the liver, possibly leading to liver disease. I have been advised that I should be monitored for as long as I am taking testosterone.    7.   I understand that testosterone can increase the amount of red blood cells and hemoglobin in my blood. While the increase is usually only to a normal male range (which does not pose health risks), a high increase can cause potentially life-threatening problems such as stroke and heart attack. I have been advised that my blood should be monitored periodically while I am taking testosterone.    8.   I understand that taking testosterone can increase my risk for diabetes by decreasing my body s response to insulin, causing weight gain, and increasing deposits of fat around my internal organs. I have been advised that my fasting blood glucose should be monitored periodically while I am taking testosterone.    9.   I understand that it is not known if testosterone increases the risk of ovarian, breast, or uterine cancer.    10. I understand that taking testosterone can lead to my cervix and the walls of my vagina becoming more fragile, and that this can lead to tears or abrasions that increase the risk of sexually transmitted infections (including HIV) if I have vaginal sex - no matter what the gender of my partner is. I have been advised that breanne discussion with my doctor about my sexual practices can help determine how best to prevent and monitor for sexually transmitted infections.    11. I have been informed that testosterone can cause headaches or migraines. I understand that if I am frequently having headaches or migraines, or the pain is  unusually severe, it is recommended that I talk with my healthcare provider.    12. I understand that testosterone can cause emotional changes, including increased irritability, frustration, and anger. I have been advised that my doctor can assist me in finding resources to explore and cope with these changes.    13. I understand that testosterone will result in changes that will be noticeable by other people, and that some people in similar circumstances have experienced harassment, discrimination, and violence, while others have lost support of loved ones. I have been advised that my doctor can assist me in finding advocacy and support resources.      Prevention of Medical Complications    I agree to take testosterone as prescribed and to tell my doctor if I am not happy with the treatment or am experiencing any problems.    I understand that the right dose or type of medication prescribed for me may not be the same as for someone else.    I understand that physical examinations and blood tests are needed on a regular basis to check for negative side effects of testosterone.    I understand that testosterone can interact with other medications (including other sources of hormones), dietary supplements, herbs, alcohol, and street drugs. I understand that being honest with my doctor about what else I am taking will help prevent medical complications that could be life-threatening. I have been informed that I will continue to get medical care no matter what information I share, and will be kept confidential.    I understand that some medical conditions make it dangerous to take testosterone. I agree that I will be checked for risky conditions before the decision to take testosterone is made.    I understand that I can choose to stop taking testosterone at any time, and that it is advised that I do this with the help of my doctor to make sure there are no negative reactions to stopping. I understand that my doctor may  suggest I reduce or stop taking testosterone if there are severe side effects or health risks that can t be controlled.

## 2023-11-15 ENCOUNTER — ALLIED HEALTH/NURSE VISIT (OUTPATIENT)
Dept: FAMILY MEDICINE | Facility: CLINIC | Age: 28
End: 2023-11-15
Payer: COMMERCIAL

## 2023-11-15 DIAGNOSIS — F64.9 GENDER DYSPHORIA: ICD-10-CM

## 2023-11-15 DIAGNOSIS — Z71.89 ENCOUNTER FOR EDUCATION ABOUT INJECTION: Primary | ICD-10-CM

## 2023-11-15 PROCEDURE — 99207 PR NO CHARGE NURSE ONLY: CPT

## 2023-11-15 NOTE — PROGRESS NOTES
Nurse teaching given on subcutaneous testosterone self-injection and the patient expresses understanding and acceptance of instructions.  Pt was able to successfully self-inject with no complications.    Instructions for drawing up medication and self-injection printed and sent home with patient.  Medication to be stored at room temperature.    Follow up visit with Dr. Dobson scheduled for 12/6/23.    Betty Norris RN 11/15/2023 2:22 PM

## 2023-11-29 ENCOUNTER — MYC REFILL (OUTPATIENT)
Dept: FAMILY MEDICINE | Facility: CLINIC | Age: 28
End: 2023-11-29
Payer: COMMERCIAL

## 2023-11-29 DIAGNOSIS — F41.1 GAD (GENERALIZED ANXIETY DISORDER): ICD-10-CM

## 2023-11-29 DIAGNOSIS — L70.0 ACNE VULGARIS: Primary | ICD-10-CM

## 2023-11-29 DIAGNOSIS — F32.0 MILD MAJOR DEPRESSION (H): ICD-10-CM

## 2023-11-29 DIAGNOSIS — F90.9 ATTENTION DEFICIT HYPERACTIVITY DISORDER (ADHD), UNSPECIFIED ADHD TYPE: ICD-10-CM

## 2023-11-29 DIAGNOSIS — F64.9 GENDER DYSPHORIA: ICD-10-CM

## 2023-11-30 RX ORDER — ATOMOXETINE 40 MG/1
80 CAPSULE ORAL DAILY
Qty: 60 CAPSULE | Refills: 2 | Status: SHIPPED | OUTPATIENT
Start: 2023-11-30 | End: 2023-12-30

## 2023-11-30 RX ORDER — FINASTERIDE 1 MG/1
1 TABLET, FILM COATED ORAL DAILY
Qty: 90 TABLET | Refills: 1 | Status: SHIPPED | OUTPATIENT
Start: 2023-11-30 | End: 2024-02-12

## 2023-11-30 RX ORDER — SPIRONOLACTONE 50 MG/1
50 TABLET, FILM COATED ORAL DAILY
Qty: 90 TABLET | Refills: 1 | Status: SHIPPED | OUTPATIENT
Start: 2023-11-30 | End: 2024-05-08

## 2023-11-30 RX ORDER — FLUOXETINE 40 MG/1
CAPSULE ORAL
Qty: 90 CAPSULE | Refills: 3 | Status: SHIPPED | OUTPATIENT
Start: 2023-11-30 | End: 2024-03-11

## 2023-12-04 NOTE — PATIENT INSTRUCTIONS
Follow up on 1/31/24 at 10am  We will get labs during this visit.  Please reach out sooner if things are not going well.  Continue testosterone 25mg every other week      Dr. Dobson

## 2023-12-06 ENCOUNTER — OFFICE VISIT (OUTPATIENT)
Dept: FAMILY MEDICINE | Facility: CLINIC | Age: 28
End: 2023-12-06
Payer: COMMERCIAL

## 2023-12-06 VITALS
SYSTOLIC BLOOD PRESSURE: 124 MMHG | HEART RATE: 98 BPM | HEIGHT: 67 IN | WEIGHT: 143.1 LBS | OXYGEN SATURATION: 100 % | RESPIRATION RATE: 18 BRPM | BODY MASS INDEX: 22.46 KG/M2 | TEMPERATURE: 98.4 F | DIASTOLIC BLOOD PRESSURE: 82 MMHG

## 2023-12-06 DIAGNOSIS — Z97.5 IUD (INTRAUTERINE DEVICE) IN PLACE: ICD-10-CM

## 2023-12-06 DIAGNOSIS — F64.9 GENDER DYSPHORIA: Primary | ICD-10-CM

## 2023-12-06 PROCEDURE — 99214 OFFICE O/P EST MOD 30 MIN: CPT | Performed by: STUDENT IN AN ORGANIZED HEALTH CARE EDUCATION/TRAINING PROGRAM

## 2023-12-06 RX ORDER — TESTOSTERONE CYPIONATE 1000 MG/10ML
25 INJECTION, SOLUTION INTRAMUSCULAR
Qty: 10 ML | Refills: 1 | Status: SHIPPED | OUTPATIENT
Start: 2023-12-06 | End: 2024-02-19

## 2023-12-06 ASSESSMENT — PAIN SCALES - GENERAL: PAINLEVEL: NO PAIN (0)

## 2023-12-06 ASSESSMENT — PATIENT HEALTH QUESTIONNAIRE - PHQ9: SUM OF ALL RESPONSES TO PHQ QUESTIONS 1-9: 4

## 2023-12-06 NOTE — PROGRESS NOTES
Assessment & Plan     Gender dysphoria  Non-binary, uses they/them pronouns. Started T injections 1 month ago. Does not want to fully transition. Injecting testosterone 25mg every other week, shot day is Wednesday. Going well-noticed mood is improved, weight changes. Will continue with current dosing, and follow up in 2 months. Will get labs then. At that time, will consider increasing dose OR keeping T dose the same, but changing to weekly dosing. Of note is on finasteride for hair loss prevention AND low dose mica for acne-will closely monitor as these may cause T suppression.  - testosterone cypionate (DEPOTESTOSTERONE) 100 MG/ML injection  Dispense: 10 mL; Refill: 1      Baldev Dobson [facility]    Jorje Shepherd is a 28 year old, presenting for the following health issues:  Follow Up (T injections started 3 weeks ago )        12/6/2023    10:07 AM   Additional Questions   Roomed by Earline   Accompanied by Self         12/6/2023    10:07 AM   Patient Reported Additional Medications   Patient reports taking the following new medications none       History of Present Illness       Reason for visit:  Testosterone Follow Up    Joao Morales eats 4 or more servings of fruits and vegetables daily.Joao Morales consumes 0 sweetened beverage(s) daily.Joao Morales exercises with enough effort to increase Joao Morales's heart rate 60 or more minutes per day.  Joao Morales exercises with enough effort to increase Joao Morales's heart rate 5 days per week.   Joao Morales is taking medications regularly.    _________    Joao is a 28 year old individual that uses pronouns They/Them/Their/Theirs that presents today for follow up of:  masculinizing hormone therapy.     Gender identity: non-binary    Any special concerns today?  no current concerns    On hormones?  YES +++ Shot day of the week? Every other wednesday      Due for labs?  No      +++ Refills of  "meds needed?  Yes    Gender care  -non-binary, they/them  -does not want to fully transition  -started T 25mg weekly  -finasteride 1mg daily (prevent hair loss)  -on mica for acne    Has done 2 shots now  Feeling stronger, inc weight  No hair loss  No bottom growth or voice changes    Shot day-every other wednesday    Having top surgery 4/4/24    ---    No past surgical history on file.    Patient Active Problem List   Diagnosis    Attention deficit hyperactivity disorder (ADHD), predominantly inattentive type    JACLYN (generalized anxiety disorder)    Mild major depression (H)    Ankle injury    Vegan    History of substance use disorder    Gender dysphoria       Current Outpatient Medications   Medication Sig Dispense Refill    Alcohol Swabs (ALCOHOL PADS) 70 % PADS 1 Pad once a week 100 each 3    atomoxetine (STRATTERA) 40 MG capsule Take 2 capsules (80 mg) by mouth daily 60 capsule 2    finasteride (PROPECIA) 1 MG tablet Take 1 tablet (1 mg) by mouth daily 90 tablet 1    FLUoxetine (PROZAC) 40 MG capsule TAKE 1 CAPSULE(40 MG) BY MOUTH DAILY 90 capsule 3    levonorgestrel (MIRENA) 52 MG (20 mcg/day) IUD 1 each by Intrauterine route once      needle, disp, 18G X 1\" MISC Use to draw up weekly injection 25 each 11    Needle, Disp, 25G X 5/8\" MISC To use to inject hormones weekly 25 each 11    Sharps Container MISC To use to dispose of needles for weekly injection 1 each 11    spironolactone (ALDACTONE) 50 MG tablet Take 1 tablet (50 mg) by mouth daily 90 tablet 1    Sulfacetamide Sodium-Sulfur 10-5 % LIQD WASH AFFECTED AREA ON FACE ONCE DAILY.  LATHER AND LET SIT FOR SEVERAL MINUTES BEFORE RINSING.      syringe, disposable, (B-D SYRINGE LUER-MARGARITA) 1 ML MISC To use with weekly injections 25 each 11    testosterone cypionate (DEPOTESTOSTERONE) 100 MG/ML injection Inject 0.25 mLs (25 mg) Subcutaneous every 14 days 10 mL 0    tretinoin (RETIN-A) 0.05 % external cream APPLY SMALL AMOUNT TO FACE EVERY OTHER NIGHT, " "INCREASING TO NIGHTLY. MOISTURIZE AFTER.         History   Smoking Status    Never   Smokeless Tobacco    Never            Review of Systems:        General  Fat redistribution: No  Weight change: YES HEENT  Voice change: No     Cardiovascular (CV)  Chest Pains: No  Shortness of breath: No Chest  Decreased exercise tolerance:  No  Breast changes/development: N/A     Gastrointestinal (GI)  Abdominal pain: No  Change in appetite: YES-more hugry Skin  Acne or oily skin: No  Change in hair: No     Genitourinary ()  Abnormal vaginal bleeding: has IUD (some spotting)   Decreased spontaneous erections: N/A  Change in libido: N/A  New sexual partners: No Musculoskeletal  Leg pain or swelling: No     Psychiatric (Psych)  Depression: No  Anxiety/Panic: No  Mood:  \"improved mood\" and \"good\"             Objective    /82 (BP Location: Right arm, Patient Position: Sitting, Cuff Size: Adult Regular)   Pulse 98   Temp 98.4  F (36.9  C) (Temporal)   Resp 18   Ht 1.71 m (5' 7.32\")   Wt 64.9 kg (143 lb 1.6 oz)   SpO2 100%   BMI 22.20 kg/m    Body mass index is 22.2 kg/m .    Physical Exam  Constitutional:       General: Joao Morales is not in acute distress.     Appearance: Joao Morales is not ill-appearing.   Eyes:      Extraocular Movements: Extraocular movements intact.      Conjunctiva/sclera: Conjunctivae normal.      Pupils: Pupils are equal, round, and reactive to light.   Pulmonary:      Effort: Pulmonary effort is normal.   Neurological:      General: No focal deficit present.      Mental Status: Joao Morales is alert and oriented to person, place, and time.   Psychiatric:         Mood and Affect: Mood normal.         Behavior: Behavior normal.        "

## 2023-12-30 ENCOUNTER — MYC REFILL (OUTPATIENT)
Dept: FAMILY MEDICINE | Facility: CLINIC | Age: 28
End: 2023-12-30
Payer: COMMERCIAL

## 2023-12-30 DIAGNOSIS — F90.9 ATTENTION DEFICIT HYPERACTIVITY DISORDER (ADHD), UNSPECIFIED ADHD TYPE: ICD-10-CM

## 2024-01-03 RX ORDER — ATOMOXETINE 40 MG/1
80 CAPSULE ORAL DAILY
Qty: 60 CAPSULE | Refills: 2 | Status: SHIPPED | OUTPATIENT
Start: 2024-01-03 | End: 2024-01-29

## 2024-01-29 ENCOUNTER — MYC REFILL (OUTPATIENT)
Dept: FAMILY MEDICINE | Facility: CLINIC | Age: 29
End: 2024-01-29
Payer: COMMERCIAL

## 2024-01-29 DIAGNOSIS — F90.9 ATTENTION DEFICIT HYPERACTIVITY DISORDER (ADHD), UNSPECIFIED ADHD TYPE: ICD-10-CM

## 2024-01-29 RX ORDER — ATOMOXETINE 40 MG/1
80 CAPSULE ORAL DAILY
Qty: 60 CAPSULE | Refills: 2 | Status: SHIPPED | OUTPATIENT
Start: 2024-01-29 | End: 2024-02-27

## 2024-01-31 ENCOUNTER — OFFICE VISIT (OUTPATIENT)
Dept: FAMILY MEDICINE | Facility: CLINIC | Age: 29
End: 2024-01-31
Payer: COMMERCIAL

## 2024-01-31 VITALS
BODY MASS INDEX: 22.92 KG/M2 | HEIGHT: 67 IN | OXYGEN SATURATION: 98 % | RESPIRATION RATE: 18 BRPM | HEART RATE: 60 BPM | DIASTOLIC BLOOD PRESSURE: 83 MMHG | TEMPERATURE: 98.1 F | SYSTOLIC BLOOD PRESSURE: 126 MMHG | WEIGHT: 146.01 LBS

## 2024-01-31 DIAGNOSIS — F64.9 GENDER DYSPHORIA: Primary | ICD-10-CM

## 2024-01-31 LAB — HGB BLD-MCNC: 13.1 G/DL (ref 11.7–17.7)

## 2024-01-31 PROCEDURE — 99214 OFFICE O/P EST MOD 30 MIN: CPT | Performed by: STUDENT IN AN ORGANIZED HEALTH CARE EDUCATION/TRAINING PROGRAM

## 2024-01-31 PROCEDURE — 84403 ASSAY OF TOTAL TESTOSTERONE: CPT | Performed by: STUDENT IN AN ORGANIZED HEALTH CARE EDUCATION/TRAINING PROGRAM

## 2024-01-31 PROCEDURE — 36415 COLL VENOUS BLD VENIPUNCTURE: CPT | Performed by: STUDENT IN AN ORGANIZED HEALTH CARE EDUCATION/TRAINING PROGRAM

## 2024-01-31 PROCEDURE — 85018 HEMOGLOBIN: CPT | Performed by: STUDENT IN AN ORGANIZED HEALTH CARE EDUCATION/TRAINING PROGRAM

## 2024-01-31 ASSESSMENT — PAIN SCALES - GENERAL: PAINLEVEL: NO PAIN (0)

## 2024-01-31 NOTE — PROGRESS NOTES
Assessment & Plan     Gender dysphoria  Non-binary, uses they/them pronouns. Does not want to fully transition, but would be ok with any masculinizing changes (except for hair loss). Has noticed more square jaw, mild bottom growth, mood is good/more calm, some weight gain, more body odor. Having top surgery April 2024. Currently taking testosterone injection every other week. Last shot was 1 week ago, so will check mid-cycle Hgb and total testosterone. Goal T level >250 for bone health. Pending results, will increase dose and/or switch to weekly dosing. Of note, pt is on finasteride for hair loss prevention AND low dose mica for acne-will closely monitor as these may cause T suppression.   - Testosterone total  - Hemoglobin      Subjective   Joao is a 29 year old, presenting for the following health issues:  Follow Up        1/31/2024     9:43 AM   Additional Questions   Roomed by Medina     History of Present Illness       Reason for visit:  Check up for testosterone    Joao Morales eats 4 or more servings of fruits and vegetables daily.Joao Morales consumes 0 sweetened beverage(s) daily.Joao Morales exercises with enough effort to increase Joao Morales's heart rate 60 or more minutes per day.  Joao Morales exercises with enough effort to increase Joao Morales's heart rate 7 days per week.   Joao Morales is taking medications regularly.          MARYANNE Shepherd is a 29 year old individual that uses pronouns They/Them/Their/Theirs that presents today for follow up of:  masculinizing hormone therapy.     Gender identity: non-binary    Any special concerns today?  no current concerns    On hormones?  YES  -testosterone 25mg every other week  -shot day every other Wednesday (last week was shot)    Options-increase dose, or keep same dose but weekly dosing  Periods?    Good-no hair loss, square face  Warmer body temp  Change in body odor  More hungry  Only 2 lbs gain  Feels stronger  Some  "bottom growth    No past surgical history on file.    Patient Active Problem List   Diagnosis    Attention deficit hyperactivity disorder (ADHD), predominantly inattentive type    JACLYN (generalized anxiety disorder)    Mild major depression (H)    Ankle injury    Vegan    History of substance use disorder    Gender dysphoria    IUD (intrauterine device) in place       Current Outpatient Medications   Medication Sig Dispense Refill    Alcohol Swabs (ALCOHOL PADS) 70 % PADS 1 Pad once a week 100 each 3    atomoxetine (STRATTERA) 40 MG capsule Take 2 capsules (80 mg) by mouth daily 60 capsule 2    finasteride (PROPECIA) 1 MG tablet Take 1 tablet (1 mg) by mouth daily 90 tablet 1    FLUoxetine (PROZAC) 40 MG capsule TAKE 1 CAPSULE(40 MG) BY MOUTH DAILY 90 capsule 3    levonorgestrel (MIRENA) 52 MG (20 mcg/day) IUD 1 each by Intrauterine route once      needle, disp, 18G X 1\" MISC Use to draw up weekly injection 25 each 11    Needle, Disp, 25G X 5/8\" MISC To use to inject hormones weekly 25 each 11    Sharps Container MISC To use to dispose of needles for weekly injection 1 each 11    spironolactone (ALDACTONE) 50 MG tablet Take 1 tablet (50 mg) by mouth daily 90 tablet 1    Sulfacetamide Sodium-Sulfur 10-5 % LIQD WASH AFFECTED AREA ON FACE ONCE DAILY.  LATHER AND LET SIT FOR SEVERAL MINUTES BEFORE RINSING.      syringe, disposable, (B-D SYRINGE LUER-MARGARITA) 1 ML MISC To use with weekly injections 25 each 11    testosterone cypionate (DEPOTESTOSTERONE) 100 MG/ML injection Inject 0.25 mLs (25 mg) Subcutaneous every 14 days 10 mL 1    tretinoin (RETIN-A) 0.05 % external cream APPLY SMALL AMOUNT TO FACE EVERY OTHER NIGHT, INCREASING TO NIGHTLY. MOISTURIZE AFTER.       History   Smoking Status    Never   Smokeless Tobacco    Never            Review of Systems:        General  Fat redistribution: No  Weight change: YES HEENT  Voice change: some     Cardiovascular (CV)  Chest Pains: No  Shortness of breath: No Chest  Decreased " "exercise tolerance:  No  Breast changes/development: N/A     Gastrointestinal (GI)  Abdominal pain: No  Change in appetite: YES Skin  Acne or oily skin: No  Change in hair: No     Genitourinary ()  Abnormal vaginal bleeding: spotting-but on IUD   Decreased spontaneous erections: N/A  Change in libido: No  New sexual partners: No Musculoskeletal  Leg pain or swelling: No     Psychiatric (Psych)  Depression: No  Anxiety/Panic: No  Mood:  \"good\"               Objective    /83 (BP Location: Right arm, Patient Position: Sitting, Cuff Size: Adult Regular)   Pulse 60   Temp 98.1  F (36.7  C) (Temporal)   Resp 18   Ht 1.71 m (5' 7.32\")   Wt 66.2 kg (146 lb 0.2 oz)   SpO2 98%   Breastfeeding No   BMI 22.65 kg/m    Body mass index is 22.65 kg/m .      Physical Exam  Constitutional:       Appearance: Normal appearance.   Eyes:      Extraocular Movements: Extraocular movements intact.      Conjunctiva/sclera: Conjunctivae normal.      Pupils: Pupils are equal, round, and reactive to light.   Pulmonary:      Effort: Pulmonary effort is normal.   Neurological:      General: No focal deficit present.      Mental Status: Joao Morales is alert and oriented to person, place, and time.   Psychiatric:         Mood and Affect: Mood normal.         Behavior: Behavior normal.          Signed Electronically by: Baldev Dobson DO    "

## 2024-01-31 NOTE — PATIENT INSTRUCTIONS
I will mychart you when the labs come back!   Goal testosterone over 250 for bone health. Range is 250-1000    Dr. Dobson

## 2024-02-04 LAB — TESTOST SERPL-MCNC: 320 NG/DL (ref 8–950)

## 2024-02-05 NOTE — RESULT ENCOUNTER NOTE
Mitali Shepherd,     Thanks for coming to clinic.  The clinician who ordered these tests is currently out of the office, but we wanted to let you know that your results have been reviewed and there are no urgent or worrisome findings.      Your clinician will review your results upon their return and may get back to you with further information if needed.      Dr.Louisa Sameer MOSQU[facility] / [facility]

## 2024-02-10 ENCOUNTER — MYC REFILL (OUTPATIENT)
Dept: FAMILY MEDICINE | Facility: CLINIC | Age: 29
End: 2024-02-10
Payer: COMMERCIAL

## 2024-02-10 DIAGNOSIS — L70.0 ACNE VULGARIS: Primary | ICD-10-CM

## 2024-02-10 DIAGNOSIS — F64.9 GENDER DYSPHORIA: ICD-10-CM

## 2024-02-12 ENCOUNTER — MYC MEDICAL ADVICE (OUTPATIENT)
Dept: FAMILY MEDICINE | Facility: CLINIC | Age: 29
End: 2024-02-12
Payer: COMMERCIAL

## 2024-02-12 RX ORDER — SULFACETAMIDE SODIUM, SULFUR 100; 50 MG/G; MG/G
1 EMULSION TOPICAL DAILY
Qty: 170 G | Refills: 3 | Status: SHIPPED | OUTPATIENT
Start: 2024-02-12

## 2024-02-12 RX ORDER — SULFACETAMIDE SODIUM, SULFUR 100; 50 MG/G; MG/G
EMULSION TOPICAL
Status: CANCELLED | OUTPATIENT
Start: 2024-02-12

## 2024-02-12 RX ORDER — FINASTERIDE 1 MG/1
1 TABLET, FILM COATED ORAL DAILY
Qty: 90 TABLET | Refills: 0 | Status: SHIPPED | OUTPATIENT
Start: 2024-02-12 | End: 2024-05-08

## 2024-02-12 RX ORDER — FINASTERIDE 1 MG/1
1 TABLET, FILM COATED ORAL DAILY
Qty: 90 TABLET | Refills: 1 | Status: CANCELLED | OUTPATIENT
Start: 2024-02-12

## 2024-02-18 ENCOUNTER — MYC MEDICAL ADVICE (OUTPATIENT)
Dept: FAMILY MEDICINE | Facility: CLINIC | Age: 29
End: 2024-02-18
Payer: COMMERCIAL

## 2024-02-18 DIAGNOSIS — F64.9 GENDER DYSPHORIA: ICD-10-CM

## 2024-02-19 RX ORDER — TESTOSTERONE CYPIONATE 1000 MG/10ML
50 INJECTION, SOLUTION INTRAMUSCULAR
Qty: 10 ML | Refills: 1 | Status: SHIPPED | OUTPATIENT
Start: 2024-02-19 | End: 2024-03-21

## 2024-02-19 NOTE — TELEPHONE ENCOUNTER
"Dr Dobson-    Please see patient message below and advise.     Your result message:  Sorry for the delay, I was out of the office.  -hemoglobin is normal  -Testosterone is 320. (We want a T level over 250 for good bone health)  -Send me a [employer] message on how you want to proceed with your T dose. See below for some suggestions.  1) keep T dose the same; repeat labs in 3 months  2) keep the same T dose, but switch to weekly dosing OR continue T shots every other week but increase the dose to 50mg.  3) increase the dose to 50mg AND switch to weekly dosing  Your T dose is in a good range for good bone health. If we push the T level closer to 450-500 you may notice more masculinizing changes (maybe faster too).  Let me know on [employer] how you want to proceed. We can repeat labs at your appointment in March.    Patient response:  \"Hi Doctor,   I'd like to increase my dose of testosterone to 50mg and keep to the same bi-weekly injection john.    Can we still do bloodwork to see where my levels are in March when I come in for my pre-op?    Hope you had a nice trip.   Thanks,  Joao Morales\"    Marlin Mitchell RN  [facility]    "

## 2024-02-27 ENCOUNTER — MYC REFILL (OUTPATIENT)
Dept: FAMILY MEDICINE | Facility: CLINIC | Age: 29
End: 2024-02-27
Payer: COMMERCIAL

## 2024-02-27 DIAGNOSIS — F90.9 ATTENTION DEFICIT HYPERACTIVITY DISORDER (ADHD), UNSPECIFIED ADHD TYPE: ICD-10-CM

## 2024-02-28 RX ORDER — ATOMOXETINE 40 MG/1
80 CAPSULE ORAL DAILY
Qty: 60 CAPSULE | Refills: 2 | Status: SHIPPED | OUTPATIENT
Start: 2024-02-28 | End: 2024-05-08

## 2024-03-11 ENCOUNTER — MYC REFILL (OUTPATIENT)
Dept: FAMILY MEDICINE | Facility: CLINIC | Age: 29
End: 2024-03-11
Payer: COMMERCIAL

## 2024-03-11 DIAGNOSIS — F41.1 GAD (GENERALIZED ANXIETY DISORDER): ICD-10-CM

## 2024-03-11 DIAGNOSIS — F32.0 MILD MAJOR DEPRESSION (H): ICD-10-CM

## 2024-03-11 RX ORDER — FLUOXETINE 40 MG/1
CAPSULE ORAL
Qty: 90 CAPSULE | Refills: 3 | Status: CANCELLED | OUTPATIENT
Start: 2024-03-11

## 2024-03-11 RX ORDER — FLUOXETINE 40 MG/1
CAPSULE ORAL
Qty: 90 CAPSULE | Refills: 1 | Status: SHIPPED | OUTPATIENT
Start: 2024-03-11 | End: 2024-09-09

## 2024-03-12 ENCOUNTER — MYC MEDICAL ADVICE (OUTPATIENT)
Dept: FAMILY MEDICINE | Facility: CLINIC | Age: 29
End: 2024-03-12
Payer: COMMERCIAL

## 2024-03-13 NOTE — TELEPHONE ENCOUNTER
Writer replied to patient via [employer].  GIOVANY CarbajalN, RN  [facility]

## 2024-03-21 ENCOUNTER — OFFICE VISIT (OUTPATIENT)
Dept: FAMILY MEDICINE | Facility: CLINIC | Age: 29
End: 2024-03-21
Payer: COMMERCIAL

## 2024-03-21 VITALS
BODY MASS INDEX: 22.7 KG/M2 | TEMPERATURE: 97.7 F | DIASTOLIC BLOOD PRESSURE: 64 MMHG | HEIGHT: 68 IN | RESPIRATION RATE: 19 BRPM | OXYGEN SATURATION: 100 % | SYSTOLIC BLOOD PRESSURE: 112 MMHG | WEIGHT: 149.8 LBS | HEART RATE: 98 BPM

## 2024-03-21 DIAGNOSIS — F32.0 MILD MAJOR DEPRESSION (H): ICD-10-CM

## 2024-03-21 DIAGNOSIS — F64.9 GENDER DYSPHORIA: ICD-10-CM

## 2024-03-21 DIAGNOSIS — Z01.818 PREOP GENERAL PHYSICAL EXAM: Primary | ICD-10-CM

## 2024-03-21 LAB — HGB BLD-MCNC: 12.8 G/DL (ref 11.7–17.7)

## 2024-03-21 PROCEDURE — 99214 OFFICE O/P EST MOD 30 MIN: CPT | Performed by: STUDENT IN AN ORGANIZED HEALTH CARE EDUCATION/TRAINING PROGRAM

## 2024-03-21 PROCEDURE — 36415 COLL VENOUS BLD VENIPUNCTURE: CPT | Performed by: STUDENT IN AN ORGANIZED HEALTH CARE EDUCATION/TRAINING PROGRAM

## 2024-03-21 PROCEDURE — 85018 HEMOGLOBIN: CPT | Performed by: STUDENT IN AN ORGANIZED HEALTH CARE EDUCATION/TRAINING PROGRAM

## 2024-03-21 PROCEDURE — 80048 BASIC METABOLIC PNL TOTAL CA: CPT | Performed by: STUDENT IN AN ORGANIZED HEALTH CARE EDUCATION/TRAINING PROGRAM

## 2024-03-21 RX ORDER — TESTOSTERONE CYPIONATE 1000 MG/10ML
50 INJECTION, SOLUTION INTRAMUSCULAR WEEKLY
Qty: 10 ML | Refills: 1 | Status: SHIPPED | OUTPATIENT
Start: 2024-03-21

## 2024-03-21 ASSESSMENT — PAIN SCALES - GENERAL: PAINLEVEL: NO PAIN (0)

## 2024-03-21 NOTE — LETTER
3/21/2024        RE: Joao Morales  [address]  [facility] [address]        Preoperative Evaluation  [facility]  [address]  [address]  [place] [address]  Phone: [phone]  Fax: [phone]  Primary Provider: Heather Andino  Pre-op Performing Provider: HEATHER ANDINO  Mar 21, 2024     Joao is a 29 year old, presenting for the following:  Pre-Op Exam        3/21/2024    10:59 AM   Additional Questions   Roomed by Yojana MORALES   Accompanied by self     Surgical Information  Surgery/Procedure: Bilateral mastectomy for gender affirmation top surgery - double incision with free nipple grafts, Bilateral excision of accessory axillary breast tissue   Surgery Location: [facility]   [address] [place] [address]  Surgeon: Qamar Sifuentes MD   Surgery Date: 4/4/2024   Time of Surgery: 10:40 AM   Where patient plans to recover: At home with family  Fax number for surgical facility: [phone]     Assessment & Plan    The proposed surgical procedure is considered INTERMEDIATE risk.    Preop general physical exam  Gender dysphoria  29yr non-binary individual with PMH ADHD, JACLYN, MDD, gender dysphoria here for pre-op for mastectomy for gender affirming care. Medically optimized for surgery. Hgb and BMP normal. Ok to proceed with surgery.    - Hemoglobin  - Basic metabolic panel    Of note we increased T to 50mg weekly. Follow up in 2 months. Will check mid-cycle labs then.    MDD, mild  Stable on prozac.      - No identified additional risk factors other than previously addressed    Antiplatelet or Anticoagulation Medication Instructions   - Patient is on no antiplatelet or anticoagulation medications.    Additional Medication Instructions   - spironolactone-hold the morning of surgery   - SSRIs, SNRIs, TCAs, Antipsychotics: Continue without modification.    - Herbal medications and vitamins: HOLD 14 days prior to  surgery.  - Topicals, hold the day of surgery  - avoid NSAIDs 7 days prior to surgery    Recommendation  APPROVAL GIVEN to proceed with proposed procedure      Subjective    HPI related to upcoming procedure:     Top surgery for gender care        3/18/2024    10:26 AM   Preop Questions   1. Have you ever had a heart attack or stroke? No   2. Have you ever had surgery on your heart or blood vessels, such as a stent placement, a coronary artery bypass, or surgery on an artery in your head, neck, heart, or legs? No   3. Do you have chest pain with activity? No   4. Do you have a history of  heart failure? No   5. Do you currently have a cold, bronchitis or symptoms of other infection? No   6. Do you have a cough, shortness of breath, or wheezing? No   7. Do you or anyone in your family have previous history of blood clots? No   8. Do you or does anyone in your family have a serious bleeding problem such as prolonged bleeding following surgeries or cuts? No   9. Have you ever had problems with anemia or been told to take iron pills? No   10. Have you had any abnormal blood loss such as black, tarry or bloody stools, or abnormal vaginal bleeding? No   11. Have you ever had a blood transfusion? No   12. Are you willing to have a blood transfusion if it is medically needed before, during, or after your surgery? Yes   13. Have you or any of your relatives ever had problems with anesthesia? No   14. Do you have sleep apnea, excessive snoring or daytime drowsiness? No   15. Do you have any artifical heart valves or other implanted medical devices like a pacemaker, defibrillator, or continuous glucose monitor? No   16. Do you have artificial joints? No   17. Are you allergic to latex? No   18. Is there any chance that you may be pregnant? No     Health Care Directive  Patient does not have a Health Care Directive or Living Will: Discussed advance care planning with patient; however, patient declined at this  "time.    Preoperative Review of    reviewed - controlled substances reflected in medication list.      Patient Active Problem List    Diagnosis Date Noted     IUD (intrauterine device) in place 12/06/2023     Priority: Medium     Gender dysphoria 10/27/2023     Priority: Medium     Vegan 02/10/2023     Priority: Medium     History of substance use disorder 02/10/2023     Priority: Medium     Adderall abuse       Attention deficit hyperactivity disorder (ADHD), predominantly inattentive type 10/15/2019     Priority: Medium     Stable  Straterra 80mg daily  Evisits q3 months for refills         JACLYN (generalized anxiety disorder) 10/15/2019     Priority: Medium     Mild major depression (H24) 10/15/2019     Priority: Medium     Ankle injury 12/19/2016     Priority: Medium      No past medical history on file.  No past surgical history on file.  Current Outpatient Medications   Medication Sig Dispense Refill     Alcohol Swabs (ALCOHOL PADS) 70 % PADS 1 Pad once a week 100 each 3     atomoxetine (STRATTERA) 40 MG capsule Take 2 capsules (80 mg) by mouth daily 60 capsule 2     finasteride (PROPECIA) 1 MG tablet Take 1 tablet (1 mg) by mouth daily 90 tablet 0     FLUoxetine (PROZAC) 40 MG capsule TAKE 1 CAPSULE(40 MG) BY MOUTH DAILY 90 capsule 1     levonorgestrel (MIRENA) 52 MG (20 mcg/day) IUD 1 each by Intrauterine route once       needle, disp, 18G X 1\" MISC Use to draw up weekly injection 25 each 11     Needle, Disp, 25G X 5/8\" MISC To use to inject hormones weekly 25 each 11     Sharps Container MISC To use to dispose of needles for weekly injection 1 each 11     spironolactone (ALDACTONE) 50 MG tablet Take 1 tablet (50 mg) by mouth daily 90 tablet 1     Sulfacetamide Sodium-Sulfur 10-5 % LIQD Apply 1 Application topically daily 170 g 3     syringe, disposable, (B-D SYRINGE LUER-MARGARITA) 1 ML MISC To use with weekly injections 25 each 11     testosterone cypionate (DEPOTESTOSTERONE) 100 MG/ML injection Inject 0.5 " "mLs (50 mg) Subcutaneous once a week 10 mL 1     tretinoin (RETIN-A) 0.05 % external cream APPLY SMALL AMOUNT TO FACE EVERY OTHER NIGHT, INCREASING TO NIGHTLY. MOISTURIZE AFTER.         No Known Allergies     Social History     Tobacco Use     Smoking status: Never     Passive exposure: Past     Smokeless tobacco: Never   Substance Use Topics     Alcohol use: Yes     History   Drug Use Unknown         Review of Systems    Review of Systems  Constitutional, neuro, ENT, endocrine, pulmonary, cardiac, gastrointestinal, genitourinary, musculoskeletal, integument and psychiatric systems are negative, except as otherwise noted.    Objective   /64 (BP Location: Right arm, Patient Position: Sitting, Cuff Size: Adult Regular)   Pulse 98   Temp 97.7  F (36.5  C) (Temporal)   Resp 19   Ht 1.717 m (5' 7.6\")   Wt 67.9 kg (149 lb 12.8 oz)   LMP  (LMP Unknown)   SpO2 100%   BMI 23.05 kg/m     Estimated body mass index is 23.05 kg/m  as calculated from the following:    Height as of this encounter: 1.717 m (5' 7.6\").    Weight as of this encounter: 67.9 kg (149 lb 12.8 oz).    Physical Exam  GENERAL: alert and no distress  EYES: Eyes grossly normal to inspection, PERRL and conjunctivae and sclerae normal  HENT: ear canals and TM's normal, nose and mouth without ulcers or lesions  NECK: no adenopathy, no asymmetry, masses, or scars  RESP: lungs clear to auscultation - no rales, rhonchi or wheezes  CV: regular rate and rhythm, normal S1 S2, no S3 or S4, no murmur, click or rub, no peripheral edema  ABDOMEN: soft, nontender, no hepatosplenomegaly, no masses and bowel sounds normal  MS: no gross musculoskeletal defects noted, no edema  SKIN: no suspicious lesions or rashes  NEURO: Normal strength and tone, mentation intact and speech normal  PSYCH: mentation appears normal, affect normal/bright    Hemoglobin   Date Value Ref Range Status   03/21/2024 12.8 11.7 - 17.7 g/dL Final     Comment:     Sex Specific Reference " Ranges:    Female  11.7-15.7  g/dL  Male    13.3-17.7   g/dL     11/20/2019 13.2 11.7 - 15.7 g/dL Final     Last Comprehensive Metabolic Panel:  Lab Results   Component Value Date     03/21/2024    POTASSIUM 4.3 03/21/2024    CHLORIDE 101 03/21/2024    CO2 25 03/21/2024    ANIONGAP 12 03/21/2024     (H) 03/21/2024    BUN 13.0 03/21/2024    CR 0.78 03/21/2024    GFRESTIMATED >90 03/21/2024    GEETA 9.7 03/21/2024     Diagnostics  Labs pending at this time.  Results will be reviewed when available.   No EKG required, no history of coronary heart disease, significant arrhythmia, peripheral arterial disease or other structural heart disease.    Revised Cardiac Risk Index (RCRI)  The patient has the following serious cardiovascular risks for perioperative complications:   - No serious cardiac risks = 0 points     RCRI Interpretation: 0 points: Class I (very low risk - 0.4% complication rate)         Signed Electronically by: Baldev Dobson DO  Copy of this evaluation report is provided to requesting physician.          Sincerely,        Baldev Dobson DO

## 2024-03-21 NOTE — LETTER
3/21/2024        RE: Joao Morales  [address]  [facility] [address]        Preoperative Evaluation  [facility]  [address]  [address]  [place] [address]  Phone: [phone]  Fax: [phone]  Primary Provider: Heather Andino  Pre-op Performing Provider: HEATHER ANDINO  Mar 21, 2024     Joao is a 29 year old, presenting for the following:  Pre-Op Exam        3/21/2024    10:59 AM   Additional Questions   Roomed by Yojana MORALES   Accompanied by self     Surgical Information  Surgery/Procedure: Bilateral mastectomy for gender affirmation top surgery - double incision with free nipple grafts, Bilateral excision of accessory axillary breast tissue   Surgery Location: [facility]   [address] [place] [id number]  Surgeon: Qamar Sifuentes MD   Surgery Date: 4/4/2024   Time of Surgery: 10:40 AM   Where patient plans to recover: At home with family  Fax number for surgical facility: [phone]     Assessment & Plan    The proposed surgical procedure is considered INTERMEDIATE risk.    Preop general physical exam  Gender dysphoria  29yr non-binary individual with PMH ADHD, JACLYN, MDD, gender dysphoria here for pre-op for mastectomy for gender affirming care. Medically optimized for surgery. Hgb and BMP ordered. Ok to proceed with surgery.    - Hemoglobin  - Basic metabolic panel    Of note we increased T to 50mg weekly. Follow up in 2 months. Will check mid-cycle labs then.    MDD, mild  Stable on prozac.      - No identified additional risk factors other than previously addressed    Antiplatelet or Anticoagulation Medication Instructions   - Patient is on no antiplatelet or anticoagulation medications.    Additional Medication Instructions   - spironolactone-hold the morning of surgery   - SSRIs, SNRIs, TCAs, Antipsychotics: Continue without modification.    - Herbal medications and vitamins: HOLD 14 days prior to  surgery.  - Topicals, hold the day of surgery  - avoid NSAIDs 7 days prior to surgery    Recommendation  APPROVAL GIVEN to proceed with proposed procedure      Subjective    HPI related to upcoming procedure:     Top surgery for gender care        3/18/2024    10:26 AM   Preop Questions   1. Have you ever had a heart attack or stroke? No   2. Have you ever had surgery on your heart or blood vessels, such as a stent placement, a coronary artery bypass, or surgery on an artery in your head, neck, heart, or legs? No   3. Do you have chest pain with activity? No   4. Do you have a history of  heart failure? No   5. Do you currently have a cold, bronchitis or symptoms of other infection? No   6. Do you have a cough, shortness of breath, or wheezing? No   7. Do you or anyone in your family have previous history of blood clots? No   8. Do you or does anyone in your family have a serious bleeding problem such as prolonged bleeding following surgeries or cuts? No   9. Have you ever had problems with anemia or been told to take iron pills? No   10. Have you had any abnormal blood loss such as black, tarry or bloody stools, or abnormal vaginal bleeding? No   11. Have you ever had a blood transfusion? No   12. Are you willing to have a blood transfusion if it is medically needed before, during, or after your surgery? Yes   13. Have you or any of your relatives ever had problems with anesthesia? No   14. Do you have sleep apnea, excessive snoring or daytime drowsiness? No   15. Do you have any artifical heart valves or other implanted medical devices like a pacemaker, defibrillator, or continuous glucose monitor? No   16. Do you have artificial joints? No   17. Are you allergic to latex? No   18. Is there any chance that you may be pregnant? No     Health Care Directive  Patient does not have a Health Care Directive or Living Will: Discussed advance care planning with patient; however, patient declined at this  "time.    Preoperative Review of    reviewed - controlled substances reflected in medication list.      Patient Active Problem List    Diagnosis Date Noted     IUD (intrauterine device) in place 12/06/2023     Priority: Medium     Gender dysphoria 10/27/2023     Priority: Medium     Vegan 02/10/2023     Priority: Medium     History of substance use disorder 02/10/2023     Priority: Medium     Adderall abuse       Attention deficit hyperactivity disorder (ADHD), predominantly inattentive type 10/15/2019     Priority: Medium     Stable  Straterra 80mg daily  Evisits q3 months for refills         JACLYN (generalized anxiety disorder) 10/15/2019     Priority: Medium     Mild major depression (H24) 10/15/2019     Priority: Medium     Ankle injury 12/19/2016     Priority: Medium      No past medical history on file.  No past surgical history on file.  Current Outpatient Medications   Medication Sig Dispense Refill     Alcohol Swabs (ALCOHOL PADS) 70 % PADS 1 Pad once a week 100 each 3     atomoxetine (STRATTERA) 40 MG capsule Take 2 capsules (80 mg) by mouth daily 60 capsule 2     finasteride (PROPECIA) 1 MG tablet Take 1 tablet (1 mg) by mouth daily 90 tablet 0     FLUoxetine (PROZAC) 40 MG capsule TAKE 1 CAPSULE(40 MG) BY MOUTH DAILY 90 capsule 1     levonorgestrel (MIRENA) 52 MG (20 mcg/day) IUD 1 each by Intrauterine route once       needle, disp, 18G X 1\" MISC Use to draw up weekly injection 25 each 11     Needle, Disp, 25G X 5/8\" MISC To use to inject hormones weekly 25 each 11     Sharps Container MISC To use to dispose of needles for weekly injection 1 each 11     spironolactone (ALDACTONE) 50 MG tablet Take 1 tablet (50 mg) by mouth daily 90 tablet 1     Sulfacetamide Sodium-Sulfur 10-5 % LIQD Apply 1 Application topically daily 170 g 3     syringe, disposable, (B-D SYRINGE LUER-MARGARITA) 1 ML MISC To use with weekly injections 25 each 11     testosterone cypionate (DEPOTESTOSTERONE) 100 MG/ML injection Inject 0.5 " "mLs (50 mg) Subcutaneous once a week 10 mL 1     tretinoin (RETIN-A) 0.05 % external cream APPLY SMALL AMOUNT TO FACE EVERY OTHER NIGHT, INCREASING TO NIGHTLY. MOISTURIZE AFTER.         No Known Allergies     Social History     Tobacco Use     Smoking status: Never     Passive exposure: Past     Smokeless tobacco: Never   Substance Use Topics     Alcohol use: Yes     History   Drug Use Unknown         Review of Systems    Review of Systems  Constitutional, neuro, ENT, endocrine, pulmonary, cardiac, gastrointestinal, genitourinary, musculoskeletal, integument and psychiatric systems are negative, except as otherwise noted.    Objective   /64 (BP Location: Right arm, Patient Position: Sitting, Cuff Size: Adult Regular)   Pulse 98   Temp 97.7  F (36.5  C) (Temporal)   Resp 19   Ht 1.717 m (5' 7.6\")   Wt 67.9 kg (149 lb 12.8 oz)   LMP  (LMP Unknown)   SpO2 100%   BMI 23.05 kg/m     Estimated body mass index is 23.05 kg/m  as calculated from the following:    Height as of this encounter: 1.717 m (5' 7.6\").    Weight as of this encounter: 67.9 kg (149 lb 12.8 oz).    Physical Exam  GENERAL: alert and no distress  EYES: Eyes grossly normal to inspection, PERRL and conjunctivae and sclerae normal  HENT: ear canals and TM's normal, nose and mouth without ulcers or lesions  NECK: no adenopathy, no asymmetry, masses, or scars  RESP: lungs clear to auscultation - no rales, rhonchi or wheezes  CV: regular rate and rhythm, normal S1 S2, no S3 or S4, no murmur, click or rub, no peripheral edema  ABDOMEN: soft, nontender, no hepatosplenomegaly, no masses and bowel sounds normal  MS: no gross musculoskeletal defects noted, no edema  SKIN: no suspicious lesions or rashes  NEURO: Normal strength and tone, mentation intact and speech normal  PSYCH: mentation appears normal, affect normal/bright    Recent Labs   Lab Test 01/31/24  1016 02/10/23  1357   HGB 13.1 11.8   PLT  --  230        Diagnostics  Labs pending at this " time.  Results will be reviewed when available.   No EKG required, no history of coronary heart disease, significant arrhythmia, peripheral arterial disease or other structural heart disease.    Revised Cardiac Risk Index (RCRI)  The patient has the following serious cardiovascular risks for perioperative complications:   - No serious cardiac risks = 0 points     RCRI Interpretation: 0 points: Class I (very low risk - 0.4% complication rate)         Signed Electronically by: Baldev Dobson DO  Copy of this evaluation report is provided to requesting physician.          Sincerely,        Baldev Dobson DO

## 2024-03-21 NOTE — PATIENT INSTRUCTIONS
Change testosterone to 50mg weekly.    Continue  -prozac  -Finasteride  -Testosterone (if shot day is surgery day though, hold and restart a few days later or resume next week)  -Atomoxetine    Hold  -Spironolactone-hold the AM of surgery  -Hold biotin starting today     Avoid ibuprofen/aleve 7 days prior      Dr. Dobson    Preparing for Your Surgery  Getting started  A nurse will call you to review your health history and instructions. They will give you an arrival time based on your scheduled surgery time. Please be ready to share:  Your doctor's clinic name and phone number  Your medical, surgical, and anesthesia history  A list of allergies and sensitivities  A list of medicines, including herbal treatments and over-the-counter drugs  Whether the patient has a legal guardian (ask how to send us the papers in advance)  Please tell us if you're pregnant--or if there's any chance you might be pregnant. Some surgeries may injure a fetus (unborn baby), so they require a pregnancy test. Surgeries that are safe for a fetus don't always need a test, and you can choose whether to have one.   If you have a child who's having surgery, please ask for a copy of Preparing for Your Child's Surgery.    Preparing for surgery  Within 10 to 30 days of surgery: Have a pre-op exam (sometimes called an H&P, or History and Physical). This can be done at a clinic or pre-operative center.  If you're having a , you may not need this exam. Talk to your care team.  At your pre-op exam, talk to your care team about all medicines you take. If you need to stop any medicines before surgery, ask when to start taking them again.  We do this for your safety. Many medicines can make you bleed too much during surgery. Some change how well surgery (anesthesia) drugs work.  Call your insurance company to let them know you're having surgery. (If you don't have insurance, call [phone].)  Call your clinic if there's any change in your  health. This includes signs of a cold or flu (sore throat, runny nose, cough, rash, fever). It also includes a scrape or scratch near the surgery site.  If you have questions on the day of surgery, call your hospital or surgery center.  Eating and drinking guidelines  For your safety: Unless your surgeon tells you otherwise, follow the guidelines below.  Eat and drink as usual until 8 hours before you arrive for surgery. After that, no food or milk.  Drink clear liquids until 2 hours before you arrive. These are liquids you can see through, like water, Gatorade, and Propel Water. They also include plain black coffee and tea (no cream or milk), candy, and breath mints. You can spit out gum when you arrive.  If you drink alcohol: Stop drinking it the night before surgery.  If your care team tells you to take medicine on the morning of surgery, it's okay to take it with a sip of water.  Preventing infection  Shower or bathe the night before and morning of your surgery. Follow the instructions your clinic gave you. (If no instructions, use regular soap.)  Don't shave or clip hair near your surgery site. We'll remove the hair if needed.  Don't smoke or vape the morning of surgery. You may chew nicotine gum up to 2 hours before surgery. A nicotine patch is okay.  Note: Some surgeries require you to completely quit smoking and nicotine. Check with your surgeon.  Your care team will make every effort to keep you safe from infection. We will:  Clean our hands often with soap and water (or an alcohol-based hand rub).  Clean the skin at your surgery site with a special soap that kills germs.  Give you a special gown to keep you warm. (Cold raises the risk of infection.)  Wear special hair covers, masks, gowns and gloves during surgery.  Give antibiotic medicine, if prescribed. Not all surgeries need antibiotics.  What to bring on the day of surgery  Photo ID and insurance card  Copy of your health care directive, if you have  one  Glasses and hearing aids (bring cases)  You can't wear contacts during surgery  Inhaler and eye drops, if you use them (tell us about these when you arrive)  CPAP machine or breathing device, if you use them  A few personal items, if spending the night  If you have . . .  A pacemaker, ICD (cardiac defibrillator) or other implant: Bring the ID card.  An implanted stimulator: Bring the remote control.  A legal guardian: Bring a copy of the certified (court-stamped) guardianship papers.  Please remove any jewelry, including body piercings. Leave jewelry and other valuables at home.  If you're going home the day of surgery  You must have a responsible adult drive you home. They should stay with you overnight as well.  If you don't have someone to stay with you, and you aren't safe to go home alone, we may keep you overnight. Insurance often won't pay for this.  After surgery  If it's hard to control your pain or you need more pain medicine, please call your surgeon's office.  Questions?   If you have any questions for your care team, list them here: _________________________________________________________________________________________________________________________________________________________________________ ____________________________________ ____________________________________ ____________________________________  For informational purposes only. Not to replace the advice of your health care provider. Copyright   2003, 2019 [facility]. All rights reserved. Clinically reviewed by Leny Will MD.     Effects of Masculinizing Hormone Therapy: When They Happen      *if you have a uterus, you can get pregnant while on masculinizing hormones. Masculinizing hormones are not birth control, and they can affect the development of the fetus. If you are trying to get pregnant, you should stop masculinizing hormones and can choose to start therapy again at a later time. The effect of masculinizing  hormone therapy on ovaries varies from person to person and is unknown.  **it may be possible to prevent and treat scalp hair loss

## 2024-03-21 NOTE — PROGRESS NOTES
Preoperative Evaluation  [facility]  [address]  [address]  [place] [address]  Phone: [phone]  Fax: [phone]  Primary Provider: Heather Andino  Pre-op Performing Provider: HEATHER ANDINO 21, 2024     Joao is a 29 year old, presenting for the following:  Pre-Op Exam        3/21/2024    10:59 AM   Additional Questions   Roomed by Yojana MORALES   Accompanied by self     Surgical Information  Surgery/Procedure: Bilateral mastectomy for gender affirmation top surgery - double incision with free nipple grafts, Bilateral excision of accessory axillary breast tissue   Surgery Location: [facility]   [address] [place] [address]  Surgeon: Qamar Sifuentes MD   Surgery Date: 4/4/2024   Time of Surgery: 10:40 AM   Where patient plans to recover: At home with family  Fax number for surgical facility: [phone]     Assessment & Plan     The proposed surgical procedure is considered INTERMEDIATE risk.    Preop general physical exam  Gender dysphoria  29yr non-binary individual with PMH ADHD, JACLYN, MDD, gender dysphoria here for pre-op for mastectomy for gender affirming care. Medically optimized for surgery. Hgb and BMP normal. Ok to proceed with surgery.    - Hemoglobin  - Basic metabolic panel    Of note we increased T to 50mg weekly. Follow up in 2 months. Will check mid-cycle labs then.    MDD, mild  Stable on prozac.      - No identified additional risk factors other than previously addressed    Antiplatelet or Anticoagulation Medication Instructions   - Patient is on no antiplatelet or anticoagulation medications.    Additional Medication Instructions   - spironolactone-hold the morning of surgery   - SSRIs, SNRIs, TCAs, Antipsychotics: Continue without modification.    - Herbal medications and vitamins: HOLD 14 days prior to surgery.  - Topicals, hold the day of surgery  - avoid NSAIDs 7 days prior to  surgery    Recommendation  APPROVAL GIVEN to proceed with proposed procedure      Subjective     HPI related to upcoming procedure:     Top surgery for gender care        3/18/2024    10:26 AM   Preop Questions   1. Have you ever had a heart attack or stroke? No   2. Have you ever had surgery on your heart or blood vessels, such as a stent placement, a coronary artery bypass, or surgery on an artery in your head, neck, heart, or legs? No   3. Do you have chest pain with activity? No   4. Do you have a history of  heart failure? No   5. Do you currently have a cold, bronchitis or symptoms of other infection? No   6. Do you have a cough, shortness of breath, or wheezing? No   7. Do you or anyone in your family have previous history of blood clots? No   8. Do you or does anyone in your family have a serious bleeding problem such as prolonged bleeding following surgeries or cuts? No   9. Have you ever had problems with anemia or been told to take iron pills? No   10. Have you had any abnormal blood loss such as black, tarry or bloody stools, or abnormal vaginal bleeding? No   11. Have you ever had a blood transfusion? No   12. Are you willing to have a blood transfusion if it is medically needed before, during, or after your surgery? Yes   13. Have you or any of your relatives ever had problems with anesthesia? No   14. Do you have sleep apnea, excessive snoring or daytime drowsiness? No   15. Do you have any artifical heart valves or other implanted medical devices like a pacemaker, defibrillator, or continuous glucose monitor? No   16. Do you have artificial joints? No   17. Are you allergic to latex? No   18. Is there any chance that you may be pregnant? No     Health Care Directive  Patient does not have a Health Care Directive or Living Will: Discussed advance care planning with patient; however, patient declined at this time.    Preoperative Review of    reviewed - controlled substances reflected in  "medication list.      Patient Active Problem List    Diagnosis Date Noted    IUD (intrauterine device) in place 12/06/2023     Priority: Medium    Gender dysphoria 10/27/2023     Priority: Medium    Vegan 02/10/2023     Priority: Medium    History of substance use disorder 02/10/2023     Priority: Medium     Adderall abuse      Attention deficit hyperactivity disorder (ADHD), predominantly inattentive type 10/15/2019     Priority: Medium     Stable  Straterra 80mg daily  Evisits q3 months for refills        JACLYN (generalized anxiety disorder) 10/15/2019     Priority: Medium    Mild major depression (H24) 10/15/2019     Priority: Medium    Ankle injury 12/19/2016     Priority: Medium      No past medical history on file.  No past surgical history on file.  Current Outpatient Medications   Medication Sig Dispense Refill    Alcohol Swabs (ALCOHOL PADS) 70 % PADS 1 Pad once a week 100 each 3    atomoxetine (STRATTERA) 40 MG capsule Take 2 capsules (80 mg) by mouth daily 60 capsule 2    finasteride (PROPECIA) 1 MG tablet Take 1 tablet (1 mg) by mouth daily 90 tablet 0    FLUoxetine (PROZAC) 40 MG capsule TAKE 1 CAPSULE(40 MG) BY MOUTH DAILY 90 capsule 1    levonorgestrel (MIRENA) 52 MG (20 mcg/day) IUD 1 each by Intrauterine route once      needle, disp, 18G X 1\" MISC Use to draw up weekly injection 25 each 11    Needle, Disp, 25G X 5/8\" MISC To use to inject hormones weekly 25 each 11    Sharps Container MISC To use to dispose of needles for weekly injection 1 each 11    spironolactone (ALDACTONE) 50 MG tablet Take 1 tablet (50 mg) by mouth daily 90 tablet 1    Sulfacetamide Sodium-Sulfur 10-5 % LIQD Apply 1 Application topically daily 170 g 3    syringe, disposable, (B-D SYRINGE LUER-MARGARITA) 1 ML MISC To use with weekly injections 25 each 11    testosterone cypionate (DEPOTESTOSTERONE) 100 MG/ML injection Inject 0.5 mLs (50 mg) Subcutaneous once a week 10 mL 1    tretinoin (RETIN-A) 0.05 % external cream APPLY SMALL AMOUNT " "TO FACE EVERY OTHER NIGHT, INCREASING TO NIGHTLY. MOISTURIZE AFTER.         No Known Allergies     Social History     Tobacco Use    Smoking status: Never     Passive exposure: Past    Smokeless tobacco: Never   Substance Use Topics    Alcohol use: Yes     History   Drug Use Unknown         Review of Systems    Review of Systems  Constitutional, neuro, ENT, endocrine, pulmonary, cardiac, gastrointestinal, genitourinary, musculoskeletal, integument and psychiatric systems are negative, except as otherwise noted.    Objective    /64 (BP Location: Right arm, Patient Position: Sitting, Cuff Size: Adult Regular)   Pulse 98   Temp 97.7  F (36.5  C) (Temporal)   Resp 19   Ht 1.717 m (5' 7.6\")   Wt 67.9 kg (149 lb 12.8 oz)   LMP  (LMP Unknown)   SpO2 100%   BMI 23.05 kg/m     Estimated body mass index is 23.05 kg/m  as calculated from the following:    Height as of this encounter: 1.717 m (5' 7.6\").    Weight as of this encounter: 67.9 kg (149 lb 12.8 oz).    Physical Exam  GENERAL: alert and no distress  EYES: Eyes grossly normal to inspection, PERRL and conjunctivae and sclerae normal  HENT: ear canals and TM's normal, nose and mouth without ulcers or lesions  NECK: no adenopathy, no asymmetry, masses, or scars  RESP: lungs clear to auscultation - no rales, rhonchi or wheezes  CV: regular rate and rhythm, normal S1 S2, no S3 or S4, no murmur, click or rub, no peripheral edema  ABDOMEN: soft, nontender, no hepatosplenomegaly, no masses and bowel sounds normal  MS: no gross musculoskeletal defects noted, no edema  SKIN: no suspicious lesions or rashes  NEURO: Normal strength and tone, mentation intact and speech normal  PSYCH: mentation appears normal, affect normal/bright    Hemoglobin   Date Value Ref Range Status   03/21/2024 12.8 11.7 - 17.7 g/dL Final     Comment:     Sex Specific Reference Ranges:    Female  11.7-15.7  g/dL  Male    13.3-17.7   g/dL     11/20/2019 13.2 11.7 - 15.7 g/dL Final     Last " Comprehensive Metabolic Panel:  Lab Results   Component Value Date     03/21/2024    POTASSIUM 4.3 03/21/2024    CHLORIDE 101 03/21/2024    CO2 25 03/21/2024    ANIONGAP 12 03/21/2024     (H) 03/21/2024    BUN 13.0 03/21/2024    CR 0.78 03/21/2024    GFRESTIMATED >90 03/21/2024    GEETA 9.7 03/21/2024     Diagnostics  Labs pending at this time.  Results will be reviewed when available.   No EKG required, no history of coronary heart disease, significant arrhythmia, peripheral arterial disease or other structural heart disease.    Revised Cardiac Risk Index (RCRI)  The patient has the following serious cardiovascular risks for perioperative complications:   - No serious cardiac risks = 0 points     RCRI Interpretation: 0 points: Class I (very low risk - 0.4% complication rate)         Signed Electronically by: Baldev Dobson DO  Copy of this evaluation report is provided to requesting physician.

## 2024-03-22 LAB
ANION GAP SERPL CALCULATED.3IONS-SCNC: 12 MMOL/L (ref 7–15)
BUN SERPL-MCNC: 13 MG/DL (ref 6–20)
CALCIUM SERPL-MCNC: 9.7 MG/DL (ref 8.6–10)
CHLORIDE SERPL-SCNC: 101 MMOL/L (ref 98–107)
CREAT SERPL-MCNC: 0.78 MG/DL (ref 0.51–1.17)
DEPRECATED HCO3 PLAS-SCNC: 25 MMOL/L (ref 22–29)
EGFRCR SERPLBLD CKD-EPI 2021: >90 ML/MIN/1.73M2
GLUCOSE SERPL-MCNC: 102 MG/DL (ref 70–99)
POTASSIUM SERPL-SCNC: 4.3 MMOL/L (ref 3.4–5.3)
SODIUM SERPL-SCNC: 138 MMOL/L (ref 135–145)

## 2024-03-30 ENCOUNTER — MYC REFILL (OUTPATIENT)
Dept: FAMILY MEDICINE | Facility: CLINIC | Age: 29
End: 2024-03-30
Payer: COMMERCIAL

## 2024-03-30 DIAGNOSIS — F90.9 ATTENTION DEFICIT HYPERACTIVITY DISORDER (ADHD), UNSPECIFIED ADHD TYPE: ICD-10-CM

## 2024-04-01 RX ORDER — ATOMOXETINE 40 MG/1
80 CAPSULE ORAL DAILY
Qty: 60 CAPSULE | Refills: 2 | OUTPATIENT
Start: 2024-04-01

## 2024-04-27 ENCOUNTER — HEALTH MAINTENANCE LETTER (OUTPATIENT)
Age: 29
End: 2024-04-27

## 2024-04-29 ENCOUNTER — TELEPHONE (OUTPATIENT)
Dept: FAMILY MEDICINE | Facility: CLINIC | Age: 29
End: 2024-04-29
Payer: COMMERCIAL

## 2024-04-29 ENCOUNTER — MYC MEDICAL ADVICE (OUTPATIENT)
Dept: FAMILY MEDICINE | Facility: CLINIC | Age: 29
End: 2024-04-29
Payer: COMMERCIAL

## 2024-04-29 ENCOUNTER — MYC REFILL (OUTPATIENT)
Dept: FAMILY MEDICINE | Facility: CLINIC | Age: 29
End: 2024-04-29
Payer: COMMERCIAL

## 2024-04-29 DIAGNOSIS — F90.9 ATTENTION DEFICIT HYPERACTIVITY DISORDER (ADHD), UNSPECIFIED ADHD TYPE: ICD-10-CM

## 2024-04-29 RX ORDER — ATOMOXETINE 40 MG/1
80 CAPSULE ORAL DAILY
Qty: 60 CAPSULE | Refills: 2 | OUTPATIENT
Start: 2024-04-29

## 2024-04-29 NOTE — TELEPHONE ENCOUNTER
Medication Question or Refill        What medication are you calling about (include dose and sig)?:      atomoxetine (STRATTERA) 40 MG capsule       Preferred Pharmacy:   [facility] - [place], [place] - [address] [place] [address]  Phone: [phone] Fax: [phone]      Controlled Substance Agreement on file:   CSA -- Patient Level:    CSA: None found at the patient level.       Who prescribed the medication?:  Baldev Joseph    Do you need a refill? Yes    When did you use the medication last?  today    Patient offered an appointment? No    Do you have any questions or concerns?  Patient will be out of meds by tomorrow. Please refill ASAP      Could we send this information to you in [facility] or would you prefer to receive a phone call?:   Patient would prefer a phone call   Okay to leave a detailed message?: Yes at Cell number on file:    Telephone Information:   Mobile [phone]

## 2024-04-30 NOTE — TELEPHONE ENCOUNTER
"\"cookie I have 1 fill left that I can fill today\" message from [facility] pharmacy       LVM relaying that to patient   "

## 2024-05-05 ENCOUNTER — MYC MEDICAL ADVICE (OUTPATIENT)
Dept: FAMILY MEDICINE | Facility: CLINIC | Age: 29
End: 2024-05-05

## 2024-05-05 DIAGNOSIS — F64.9 GENDER DYSPHORIA: Primary | ICD-10-CM

## 2024-05-06 ENCOUNTER — LAB (OUTPATIENT)
Dept: LAB | Facility: CLINIC | Age: 29
End: 2024-05-06
Payer: COMMERCIAL

## 2024-05-06 DIAGNOSIS — F64.9 GENDER DYSPHORIA: ICD-10-CM

## 2024-05-06 LAB — HGB BLD-MCNC: 11.5 G/DL (ref 11.7–17.7)

## 2024-05-06 PROCEDURE — 36415 COLL VENOUS BLD VENIPUNCTURE: CPT

## 2024-05-06 PROCEDURE — 84403 ASSAY OF TOTAL TESTOSTERONE: CPT

## 2024-05-06 PROCEDURE — 85018 HEMOGLOBIN: CPT

## 2024-05-06 NOTE — TELEPHONE ENCOUNTER
"Dr. Dobson,    Pt called and states you had instructed her to check labs prior to her 5/8 appt for T dosing. Pt did scheduled for labe only appt for 5/5 but appt was cancelled due to no orders entered.   Pt is concerned that \"timing is off for T dosing\" per your  [employer] message. Please put in lab order advise if pt need to reschedule 5/8 appt with you. If so, it ok to DB or scheduled in reserved slot?    Sending via priority due to lab and appt sensitivity?    Nellie MUÑOZ RN  [facility]    "

## 2024-05-08 ENCOUNTER — OFFICE VISIT (OUTPATIENT)
Dept: FAMILY MEDICINE | Facility: CLINIC | Age: 29
End: 2024-05-08
Payer: COMMERCIAL

## 2024-05-08 VITALS
DIASTOLIC BLOOD PRESSURE: 72 MMHG | WEIGHT: 142 LBS | OXYGEN SATURATION: 100 % | TEMPERATURE: 97.2 F | RESPIRATION RATE: 18 BRPM | HEIGHT: 67 IN | HEART RATE: 109 BPM | BODY MASS INDEX: 22.29 KG/M2 | SYSTOLIC BLOOD PRESSURE: 110 MMHG

## 2024-05-08 DIAGNOSIS — F90.9 ATTENTION DEFICIT HYPERACTIVITY DISORDER (ADHD), UNSPECIFIED ADHD TYPE: ICD-10-CM

## 2024-05-08 DIAGNOSIS — F64.9 GENDER DYSPHORIA: Primary | ICD-10-CM

## 2024-05-08 DIAGNOSIS — L70.0 ACNE VULGARIS: ICD-10-CM

## 2024-05-08 DIAGNOSIS — D64.9 LOW HEMOGLOBIN: ICD-10-CM

## 2024-05-08 PROCEDURE — 99214 OFFICE O/P EST MOD 30 MIN: CPT | Performed by: STUDENT IN AN ORGANIZED HEALTH CARE EDUCATION/TRAINING PROGRAM

## 2024-05-08 RX ORDER — SPIRONOLACTONE 50 MG/1
50 TABLET, FILM COATED ORAL DAILY
Qty: 90 TABLET | Refills: 3 | Status: SHIPPED | OUTPATIENT
Start: 2024-05-08

## 2024-05-08 RX ORDER — ATOMOXETINE 40 MG/1
80 CAPSULE ORAL DAILY
Qty: 60 CAPSULE | Refills: 5 | Status: SHIPPED | OUTPATIENT
Start: 2024-05-08 | End: 2024-09-27

## 2024-05-08 RX ORDER — FINASTERIDE 1 MG/1
1 TABLET, FILM COATED ORAL DAILY
Qty: 90 TABLET | Refills: 3 | Status: SHIPPED | OUTPATIENT
Start: 2024-05-08

## 2024-05-08 NOTE — PROGRESS NOTES
Assessment & Plan     Gender dysphoria  Non-binary, uses they/them pronouns. Does not want to fully transition, but would be ok with any masculinizing changes (except for hair loss). Has noticed voice changes, change in body shape. S/P top surgery-healing well. Doing well with testosterone injections 50mg weekly. Will await T level. If within range, will continue with current dose and space out visits to Q 4-6 months. Continue finasteride for hair loss prevention.  - finasteride (PROPECIA) 1 MG tablet  Dispense: 90 tablet; Refill: 3    Low hemoglobin  Pt went to ED after surgery due to fainting-hgb was 8.9. Symptoms improved with IVF. Hgb today is improved at 11.5. Suspect fainting was related to post-op blood loss/anemia. No further dizziness/fainting. Will defer ziopatch today. Recheck hgb in 3-4 months. No acute concerns.    Attention deficit hyperactivity disorder (ADHD), unspecified ADHD type  Stable, refilled.  - atomoxetine (STRATTERA) 40 MG capsule  Dispense: 60 capsule; Refill: 5    Acne vulgaris  Stable, refilled.  - spironolactone (ALDACTONE) 50 MG tablet  Dispense: 90 tablet; Refill: 3    Subjective   Joao is a 29 year old, presenting for the following health issues:  Follow Up        5/8/2024    10:42 AM   Additional Questions   Roomed by Anne     History of Present Illness       Reason for visit:  Testosterone Check Up    Joao eats 2-3 servings of fruits and vegetables daily.Joao consumes 0 sweetened beverage(s) daily.Joao exercises with enough effort to increase Joao's heart rate 30 to 60 minutes per day.  Joao exercises with enough effort to increase Joao's heart rate 5 days per week.   Joao is taking medications regularly.            HPI   Joao is a 29 year old individual that uses pronouns They/Them/Their/Theirs that presents today for follow up of:  masculinizing hormone therapy.     Gender identity: non binary    Gender dysphoria  -inc T to 50mg weekly on 3/21/24  -recent top  "surgery    Was in ER for fainting after top surgery. Told hgb was 7.0. Gave fluids.   No further dizziness/fainting since  Told had a heart murmur as a kid  Resolved now    Had seroma-drained  Doing well now  Seeing surgeon again in 2 weeks    New injection is doing well      No past surgical history on file.    Patient Active Problem List   Diagnosis    Attention deficit hyperactivity disorder (ADHD), predominantly inattentive type    JACLYN (generalized anxiety disorder)    Mild major depression (H24)    Ankle injury    Vegan    History of substance use disorder    Gender dysphoria    IUD (intrauterine device) in place       Current Outpatient Medications   Medication Sig Dispense Refill    Alcohol Swabs (ALCOHOL PADS) 70 % PADS 1 Pad once a week 100 each 3    atomoxetine (STRATTERA) 40 MG capsule Take 2 capsules (80 mg) by mouth daily 60 capsule 5    finasteride (PROPECIA) 1 MG tablet Take 1 tablet (1 mg) by mouth daily 90 tablet 3    FLUoxetine (PROZAC) 40 MG capsule TAKE 1 CAPSULE(40 MG) BY MOUTH DAILY 90 capsule 1    levonorgestrel (MIRENA) 52 MG (20 mcg/day) IUD 1 each by Intrauterine route once      needle, disp, 18G X 1\" MISC Use to draw up weekly injection 25 each 11    Needle, Disp, 25G X 5/8\" MISC To use to inject hormones weekly 25 each 11    Sharps Container MISC To use to dispose of needles for weekly injection 1 each 11    spironolactone (ALDACTONE) 50 MG tablet Take 1 tablet (50 mg) by mouth daily 90 tablet 3    Sulfacetamide Sodium-Sulfur 10-5 % LIQD Apply 1 Application topically daily 170 g 3    syringe, disposable, (B-D SYRINGE LUER-MARGARITA) 1 ML MISC To use with weekly injections 25 each 11    testosterone cypionate (DEPOTESTOSTERONE) 100 MG/ML injection Inject 0.5 mLs (50 mg) Subcutaneous once a week 10 mL 1    tretinoin (RETIN-A) 0.05 % external cream APPLY SMALL AMOUNT TO FACE EVERY OTHER NIGHT, INCREASING TO NIGHTLY. MOISTURIZE AFTER.         History   Smoking Status    Never   Smokeless Tobacco    " "Never            Review of Systems:        General  Fat redistribution: YES  Weight change: No HEENT  Voice change: YES     Cardiovascular (CV)  Chest Pains: No  Shortness of breath: No Chest  Decreased exercise tolerance:  No  Breast changes/development: N/A     Gastrointestinal (GI)  Abdominal pain: No  Change in appetite: No Skin  Acne or oily skin: No  Change in hair: No     Genitourinary ()  Abnormal vaginal bleeding: N/A   Decreased spontaneous erections: N/A  Change in libido: No  New sexual partners: No Musculoskeletal  Leg pain or swelling: No     Psychiatric (Psych)  Depression: No  Anxiety/Panic: No  Mood:  \"good\"               Objective    /72 (BP Location: Right arm, Patient Position: Sitting, Cuff Size: Adult Regular)   Pulse 109   Temp 97.2  F (36.2  C) (Temporal)   Resp 18   Ht 1.71 m (5' 7.32\")   Wt 64.4 kg (142 lb)   LMP  (LMP Unknown)   SpO2 100%   BMI 22.03 kg/m    Body mass index is 22.03 kg/m .    Physical Exam  Constitutional:       General: Joao is not in acute distress.     Appearance: Joao is not ill-appearing.   Eyes:      Extraocular Movements: Extraocular movements intact.      Conjunctiva/sclera: Conjunctivae normal.      Pupils: Pupils are equal, round, and reactive to light.   Pulmonary:      Effort: Pulmonary effort is normal.   Neurological:      General: No focal deficit present.      Mental Status: Joao is alert and oriented to person, place, and time.   Psychiatric:         Mood and Affect: Mood normal.         Behavior: Behavior normal.        Signed Electronically by: Baldev Dobson,     "

## 2024-05-09 LAB — TESTOST SERPL-MCNC: 705 NG/DL (ref 8–950)

## 2024-05-23 NOTE — TELEPHONE ENCOUNTER
Reason for call:  Other   Patient called regarding (reason for call): Pt will be oot in [place]; please send refill of sertraline to [address] [facility], [address] [facility]   Additional comments:     Phone number to reach patient:  Cell number on file:    Telephone Information:   Mobile [phone]       Best Time:  any    Can we leave a detailed message on this number?  YES     Tolerated infusion without incident: No adverse reactions noted: Verified follow up appt with patient ( 05/24/24 ): AVS offered and declined

## 2024-05-29 ENCOUNTER — APPOINTMENT (OUTPATIENT)
Dept: URBAN - METROPOLITAN AREA CLINIC 256 | Age: 29
Setting detail: DERMATOLOGY
End: 2024-05-29

## 2024-05-29 VITALS — HEIGHT: 68 IN | WEIGHT: 145 LBS

## 2024-05-29 DIAGNOSIS — Z87.2 PERSONAL HISTORY OF DISEASES OF THE SKIN AND SUBCUTANEOUS TISSUE: ICD-10-CM

## 2024-05-29 DIAGNOSIS — D22 MELANOCYTIC NEVI: ICD-10-CM

## 2024-05-29 DIAGNOSIS — D18.0 HEMANGIOMA: ICD-10-CM

## 2024-05-29 DIAGNOSIS — Z71.89 OTHER SPECIFIED COUNSELING: ICD-10-CM

## 2024-05-29 DIAGNOSIS — L57.8 OTHER SKIN CHANGES DUE TO CHRONIC EXPOSURE TO NONIONIZING RADIATION: ICD-10-CM

## 2024-05-29 DIAGNOSIS — L82.1 OTHER SEBORRHEIC KERATOSIS: ICD-10-CM

## 2024-05-29 PROBLEM — D22.61 MELANOCYTIC NEVI OF RIGHT UPPER LIMB, INCLUDING SHOULDER: Status: ACTIVE | Noted: 2024-05-29

## 2024-05-29 PROBLEM — D22.62 MELANOCYTIC NEVI OF LEFT UPPER LIMB, INCLUDING SHOULDER: Status: ACTIVE | Noted: 2024-05-29

## 2024-05-29 PROBLEM — D22.71 MELANOCYTIC NEVI OF RIGHT LOWER LIMB, INCLUDING HIP: Status: ACTIVE | Noted: 2024-05-29

## 2024-05-29 PROBLEM — D22.72 MELANOCYTIC NEVI OF LEFT LOWER LIMB, INCLUDING HIP: Status: ACTIVE | Noted: 2024-05-29

## 2024-05-29 PROBLEM — D22.5 MELANOCYTIC NEVI OF TRUNK: Status: ACTIVE | Noted: 2024-05-29

## 2024-05-29 PROBLEM — D18.01 HEMANGIOMA OF SKIN AND SUBCUTANEOUS TISSUE: Status: ACTIVE | Noted: 2024-05-29

## 2024-05-29 PROCEDURE — 99203 OFFICE O/P NEW LOW 30 MIN: CPT

## 2024-05-29 PROCEDURE — OTHER SUNSCREEN RECOMMENDATIONS: OTHER

## 2024-05-29 PROCEDURE — OTHER MIPS QUALITY: OTHER

## 2024-05-29 PROCEDURE — OTHER COUNSELING: OTHER

## 2024-05-29 PROCEDURE — OTHER PATIENT SPECIFIC COUNSELING: OTHER

## 2024-05-29 ASSESSMENT — LOCATION SIMPLE DESCRIPTION DERM
LOCATION SIMPLE: ABDOMEN
LOCATION SIMPLE: RIGHT THIGH
LOCATION SIMPLE: LEFT FOREARM
LOCATION SIMPLE: LEFT UPPER BACK
LOCATION SIMPLE: LEFT CHEEK
LOCATION SIMPLE: RIGHT POSTERIOR UPPER ARM
LOCATION SIMPLE: RIGHT FOREARM
LOCATION SIMPLE: LEFT THIGH
LOCATION SIMPLE: CHEST
LOCATION SIMPLE: LEFT LOWER BACK
LOCATION SIMPLE: LEFT UPPER ARM
LOCATION SIMPLE: UPPER BACK

## 2024-05-29 ASSESSMENT — LOCATION DETAILED DESCRIPTION DERM
LOCATION DETAILED: LEFT ANTERIOR PROXIMAL THIGH
LOCATION DETAILED: LEFT ANTECUBITAL SKIN
LOCATION DETAILED: LEFT ANTERIOR DISTAL THIGH
LOCATION DETAILED: RIGHT PROXIMAL DORSAL FOREARM
LOCATION DETAILED: RIGHT VENTRAL DISTAL FOREARM
LOCATION DETAILED: RIGHT VENTRAL PROXIMAL FOREARM
LOCATION DETAILED: LEFT INFERIOR CENTRAL MALAR CHEEK
LOCATION DETAILED: LEFT DISTAL DORSAL FOREARM
LOCATION DETAILED: LEFT MEDIAL UPPER BACK
LOCATION DETAILED: RIGHT PROXIMAL POSTERIOR UPPER ARM
LOCATION DETAILED: MIDDLE STERNUM
LOCATION DETAILED: RIGHT ANTERIOR PROXIMAL THIGH
LOCATION DETAILED: LEFT VENTRAL DISTAL FOREARM
LOCATION DETAILED: INFERIOR THORACIC SPINE
LOCATION DETAILED: RIGHT ANTERIOR DISTAL THIGH
LOCATION DETAILED: LEFT VENTRAL PROXIMAL FOREARM
LOCATION DETAILED: EPIGASTRIC SKIN
LOCATION DETAILED: LEFT INFERIOR LATERAL MIDBACK

## 2024-05-29 ASSESSMENT — LOCATION ZONE DERM
LOCATION ZONE: FACE
LOCATION ZONE: LEG
LOCATION ZONE: TRUNK
LOCATION ZONE: ARM

## 2024-05-29 NOTE — PROCEDURE: PATIENT SPECIFIC COUNSELING
Detail Level: Zone
-Discussed genetic component of Melanoma and that it is recommended they get annual skin checks.\\n-Recommend using spf 30 at a minimum and using sun protective clothing.
- Patient reports having a mole biopsied and then further removed due to being atypical.\\n- JOSÉ filled out for more information.

## 2024-08-05 ENCOUNTER — APPOINTMENT (OUTPATIENT)
Dept: URBAN - METROPOLITAN AREA CLINIC 256 | Age: 29
Setting detail: DERMATOLOGY
End: 2024-08-05

## 2024-08-05 VITALS — HEIGHT: 67 IN | WEIGHT: 140 LBS

## 2024-08-05 DIAGNOSIS — L24 IRRITANT CONTACT DERMATITIS: ICD-10-CM

## 2024-08-05 PROBLEM — L24.9 IRRITANT CONTACT DERMATITIS, UNSPECIFIED CAUSE: Status: ACTIVE | Noted: 2024-08-05

## 2024-08-05 PROCEDURE — OTHER PRESCRIPTION: OTHER

## 2024-08-05 PROCEDURE — OTHER COUNSELING: OTHER

## 2024-08-05 PROCEDURE — OTHER PHOTO-DOCUMENTATION: OTHER

## 2024-08-05 PROCEDURE — OTHER PATIENT SPECIFIC COUNSELING: OTHER

## 2024-08-05 PROCEDURE — OTHER MIPS QUALITY: OTHER

## 2024-08-05 PROCEDURE — OTHER PRESCRIPTION MEDICATION MANAGEMENT: OTHER

## 2024-08-05 PROCEDURE — 99213 OFFICE O/P EST LOW 20 MIN: CPT

## 2024-08-05 RX ORDER — TRIAMCINOLONE ACETONIDE 1 MG/G
CREAM TOPICAL
Qty: 30 | Refills: 0 | Status: ERX | COMMUNITY
Start: 2024-08-05

## 2024-08-05 ASSESSMENT — LOCATION SIMPLE DESCRIPTION DERM
LOCATION SIMPLE: LEFT AXILLARY VAULT
LOCATION SIMPLE: RIGHT HAND
LOCATION SIMPLE: RIGHT AXILLARY VAULT

## 2024-08-05 ASSESSMENT — LOCATION DETAILED DESCRIPTION DERM
LOCATION DETAILED: LEFT AXILLARY VAULT
LOCATION DETAILED: RIGHT RADIAL DORSAL HAND
LOCATION DETAILED: RIGHT AXILLARY VAULT

## 2024-08-05 ASSESSMENT — BSA RASH: BSA RASH: 3

## 2024-08-05 ASSESSMENT — LOCATION ZONE DERM
LOCATION ZONE: HAND
LOCATION ZONE: AXILLAE

## 2024-08-05 ASSESSMENT — PAIN INTENSITY VAS: HOW INTENSE IS YOUR PAIN 0 BEING NO PAIN, 10 BEING THE MOST SEVERE PAIN POSSIBLE?: NO PAIN

## 2024-08-05 ASSESSMENT — SEVERITY ASSESSMENT 2020: SEVERITY 2020: MILD

## 2024-08-05 ASSESSMENT — ITCH NUMERIC RATING SCALE: HOW SEVERE IS YOUR ITCHING?: 5

## 2024-08-05 NOTE — PROCEDURE: PATIENT SPECIFIC COUNSELING
Detail Level: Zone
-Discussed dx of ICD vs Eczema\\n-To treat, recommended the patient trials a topical steroid BID x 2 weeks\\nAdvised the patient to also use only gentle/fragrance free products to prevent future flare ups\\n-Recheck in 4 weeks if persisting\\n-Patient was agreeable

## 2024-08-05 NOTE — PROCEDURE: PRESCRIPTION MEDICATION MANAGEMENT
Plan: If persisting RTC in 4 weeks, sooner with concerns
Detail Level: Zone
Render In Strict Bullet Format?: No
Initiate Treatment: Apply triamcinolone acetonide 0.1 % topical cream to affected areas on the body twice daily for up to two weeks. No more than 14 days monthly

## 2024-08-08 ENCOUNTER — PATIENT OUTREACH (OUTPATIENT)
Dept: CARE COORDINATION | Facility: CLINIC | Age: 29
End: 2024-08-08
Payer: COMMERCIAL

## 2024-09-09 ENCOUNTER — MYC REFILL (OUTPATIENT)
Dept: FAMILY MEDICINE | Facility: CLINIC | Age: 29
End: 2024-09-09
Payer: COMMERCIAL

## 2024-09-09 DIAGNOSIS — F41.1 GAD (GENERALIZED ANXIETY DISORDER): ICD-10-CM

## 2024-09-09 DIAGNOSIS — F32.0 MILD MAJOR DEPRESSION (H): ICD-10-CM

## 2024-09-09 RX ORDER — FLUOXETINE 40 MG/1
CAPSULE ORAL
Qty: 90 CAPSULE | Refills: 0 | Status: SHIPPED | OUTPATIENT
Start: 2024-09-09

## 2024-09-23 DIAGNOSIS — F90.9 ATTENTION DEFICIT HYPERACTIVITY DISORDER (ADHD), UNSPECIFIED ADHD TYPE: ICD-10-CM

## 2024-09-23 RX ORDER — ATOMOXETINE 40 MG/1
80 CAPSULE ORAL DAILY
Qty: 60 CAPSULE | Refills: 5 | OUTPATIENT
Start: 2024-09-23

## 2024-09-27 ENCOUNTER — MYC REFILL (OUTPATIENT)
Dept: FAMILY MEDICINE | Facility: CLINIC | Age: 29
End: 2024-09-27
Payer: COMMERCIAL

## 2024-09-27 ENCOUNTER — MYC MEDICAL ADVICE (OUTPATIENT)
Dept: FAMILY MEDICINE | Facility: CLINIC | Age: 29
End: 2024-09-27
Payer: COMMERCIAL

## 2024-09-27 DIAGNOSIS — F90.9 ATTENTION DEFICIT HYPERACTIVITY DISORDER (ADHD), UNSPECIFIED ADHD TYPE: ICD-10-CM

## 2024-09-27 RX ORDER — ATOMOXETINE 40 MG/1
80 CAPSULE ORAL DAILY
Qty: 60 CAPSULE | Refills: 5 | OUTPATIENT
Start: 2024-09-27

## 2024-09-30 RX ORDER — ATOMOXETINE 40 MG/1
80 CAPSULE ORAL DAILY
Qty: 180 CAPSULE | Refills: 0 | Status: SHIPPED | OUTPATIENT
Start: 2024-09-30 | End: 2024-12-29

## 2024-11-07 ASSESSMENT — PATIENT HEALTH QUESTIONNAIRE - PHQ9
SUM OF ALL RESPONSES TO PHQ QUESTIONS 1-9: 4
SUM OF ALL RESPONSES TO PHQ QUESTIONS 1-9: 4
10. IF YOU CHECKED OFF ANY PROBLEMS, HOW DIFFICULT HAVE THESE PROBLEMS MADE IT FOR YOU TO DO YOUR WORK, TAKE CARE OF THINGS AT HOME, OR GET ALONG WITH OTHER PEOPLE: SOMEWHAT DIFFICULT

## 2024-11-08 ENCOUNTER — OFFICE VISIT (OUTPATIENT)
Dept: FAMILY MEDICINE | Facility: CLINIC | Age: 29
End: 2024-11-08
Payer: COMMERCIAL

## 2024-11-08 VITALS
HEART RATE: 96 BPM | BODY MASS INDEX: 23.93 KG/M2 | RESPIRATION RATE: 18 BRPM | TEMPERATURE: 98.6 F | WEIGHT: 157.9 LBS | HEIGHT: 68 IN | SYSTOLIC BLOOD PRESSURE: 100 MMHG | DIASTOLIC BLOOD PRESSURE: 60 MMHG | OXYGEN SATURATION: 100 %

## 2024-11-08 DIAGNOSIS — F64.9 GENDER DYSPHORIA: ICD-10-CM

## 2024-11-08 PROCEDURE — 90656 IIV3 VACC NO PRSV 0.5 ML IM: CPT | Performed by: STUDENT IN AN ORGANIZED HEALTH CARE EDUCATION/TRAINING PROGRAM

## 2024-11-08 PROCEDURE — 91320 SARSCV2 VAC 30MCG TRS-SUC IM: CPT | Performed by: STUDENT IN AN ORGANIZED HEALTH CARE EDUCATION/TRAINING PROGRAM

## 2024-11-08 PROCEDURE — 90471 IMMUNIZATION ADMIN: CPT | Performed by: STUDENT IN AN ORGANIZED HEALTH CARE EDUCATION/TRAINING PROGRAM

## 2024-11-08 PROCEDURE — 90480 ADMN SARSCOV2 VAC 1/ONLY CMP: CPT | Performed by: STUDENT IN AN ORGANIZED HEALTH CARE EDUCATION/TRAINING PROGRAM

## 2024-11-08 PROCEDURE — 99214 OFFICE O/P EST MOD 30 MIN: CPT | Mod: 25 | Performed by: STUDENT IN AN ORGANIZED HEALTH CARE EDUCATION/TRAINING PROGRAM

## 2024-11-08 RX ORDER — TESTOSTERONE CYPIONATE 1000 MG/10ML
50 INJECTION, SOLUTION INTRAMUSCULAR WEEKLY
Qty: 10 ML | Refills: 1 | Status: SHIPPED | OUTPATIENT
Start: 2024-11-08

## 2024-11-08 RX ORDER — FINASTERIDE 1 MG/1
1 TABLET, FILM COATED ORAL DAILY
Qty: 90 TABLET | Refills: 3 | Status: CANCELLED | OUTPATIENT
Start: 2024-11-08

## 2024-11-08 ASSESSMENT — PAIN SCALES - GENERAL: PAINLEVEL_OUTOF10: NO PAIN (0)

## 2024-11-08 NOTE — PROGRESS NOTES
"  Assessment & Plan     Gender dysphoria  Joao is a 29 year old non-binary individual that uses pronouns They/Them/Their/Theirs that presents today for follow up of:  masculinizing hormone therapy. Has been on T x 1 year. Has noticed voice changes, fat redistribution, wider shoulders. No bottom growth, but they are ok with this. On finasteride, so this may be affecting that? Overall happy with changes. S/P top surgery. Doing well with T injections 50mg weekly (shot day wed).      Plan:  -return for mid-cycle labs below  -continue T 50mg weekly  -follow up Q6 months    - needle, disp, 18G X 1\" MISC  Dispense: 25 each; Refill: 11  - Needle, Disp, 25G X 5/8\" MISC  Dispense: 25 each; Refill: 11  - testosterone cypionate (DEPOTESTOSTERONE) 100 MG/ML injection  Dispense: 10 mL; Refill: 1  - Testosterone, total  - Lipid panel  - Hemoglobin A1c  - Comprehensive metabolic panel  - CBC with platelets    The longitudinal plan of care for the diagnosis(es)/condition(s) as documented were addressed during this visit. Due to the added complexity in care, I will continue to support Joao in the subsequent management and with ongoing continuity of care.      Subjective   Joao is a 29 year old, presenting for the following health issues:  History of Present Illness (Test Check-in)        11/8/2024    10:19 AM   Additional Questions   Roomed by SYDNI Garces   Accompanied by Self     History of Present Illness       Reason for visit:  Testosterone blood level test and check up    Joao eats 4 or more servings of fruits and vegetables daily.Joao consumes 0 sweetened beverage(s) daily.Joao exercises with enough effort to increase Joao's heart rate 60 or more minutes per day.  Joao exercises with enough effort to increase Joao's heart rate 7 days per week.   Joao is taking medications regularly.            HPI     Joao is a 29 year old individual that uses pronouns They/Them/Their/Theirs that presents today for follow up of:  " "masculinizing hormone therapy.     Non-binary, uses they/them pronouns. Does not want to fully transition, but would be ok with any masculinizing changes (except for hair loss). Has noticed voice changes, change in body shape. S/P top surgery. Doing well with testosterone injections 50mg weekly. Continue finasteride for hair loss prevention.     Shot day-  Noticed: voice changes, wider shoulders, fat distrib  Mood is good  Shot day: Wednesday  ---    Past Surgical History:   Procedure Laterality Date    MASTECTOMY Bilateral     2024       Patient Active Problem List   Diagnosis    Attention deficit hyperactivity disorder (ADHD), predominantly inattentive type    JACLYN (generalized anxiety disorder)    Mild major depression (H)    Ankle injury    Vegan    History of substance use disorder    Gender dysphoria    IUD (intrauterine device) in place     Current Outpatient Medications   Medication Sig Dispense Refill    needle, disp, 18G X 1\" MISC Use to draw up weekly injection 25 each 11    Needle, Disp, 25G X 5/8\" MISC To use to inject hormones weekly 25 each 11    testosterone cypionate (DEPOTESTOSTERONE) 100 MG/ML injection Inject 0.5 mLs (50 mg) subcutaneously once a week. 10 mL 1    Alcohol Swabs (ALCOHOL PADS) 70 % PADS 1 Pad once a week 100 each 3    atomoxetine (STRATTERA) 40 MG capsule Take 2 capsules (80 mg) by mouth daily. 180 capsule 0    finasteride (PROPECIA) 1 MG tablet Take 1 tablet (1 mg) by mouth daily 90 tablet 3    FLUoxetine (PROZAC) 40 MG capsule TAKE 1 CAPSULE(40 MG) BY MOUTH DAILY 90 capsule 0    levonorgestrel (MIRENA) 52 MG (20 mcg/day) IUD 1 each by Intrauterine route once      Sharps Container MISC To use to dispose of needles for weekly injection 1 each 11    spironolactone (ALDACTONE) 50 MG tablet Take 1 tablet (50 mg) by mouth daily 90 tablet 3    Sulfacetamide Sodium-Sulfur 10-5 % LIQD Apply 1 Application topically daily 170 g 3    syringe, disposable, (B-D SYRINGE LUER-MARGARITA) 1 ML MISC To " "use with weekly injections 25 each 11    tretinoin (RETIN-A) 0.05 % external cream APPLY SMALL AMOUNT TO FACE EVERY OTHER NIGHT, INCREASING TO NIGHTLY. MOISTURIZE AFTER.         History   Smoking Status    Never   Smokeless Tobacco    Never            Review of Systems:        General  Fat redistribution: YES  Weight change: YES HEENT  Voice change: YES     Cardiovascular (CV)  Chest Pains: No  Shortness of breath: No Chest  Decreased exercise tolerance:  No  Breast changes/development: N/A     Gastrointestinal (GI)  Abdominal pain: No  Change in appetite: YES Skin  Acne or oily skin: YES  Change in hair: No     Genitourinary ()  Abnormal vaginal bleeding: No IUD in place  Decreased spontaneous erections: N/A  Change in libido: did not ask  New sexual partners: No Musculoskeletal  Leg pain or swelling: No     Psychiatric (Psych)  Depression: No  Anxiety/Panic: No  Mood:  \"good\"               Objective    /60 (BP Location: Right arm, Patient Position: Sitting, Cuff Size: Adult Regular)   Pulse 96   Temp 98.6  F (37  C) (Temporal)   Resp 18   Ht 1.715 m (5' 7.5\")   Wt 71.6 kg (157 lb 14.4 oz)   SpO2 100%   BMI 24.37 kg/m    Body mass index is 24.37 kg/m .    Physical Exam  Constitutional:       General: Joao is not in acute distress.     Appearance: Joao is not ill-appearing.   Pulmonary:      Effort: Pulmonary effort is normal.   Neurological:      General: No focal deficit present.      Mental Status: Joao is alert. Mental status is at baseline.        Signed Electronically by: Baldev Dobson,     "

## 2024-11-10 ENCOUNTER — LAB (OUTPATIENT)
Dept: LAB | Facility: CLINIC | Age: 29
End: 2024-11-10
Payer: COMMERCIAL

## 2024-11-10 DIAGNOSIS — F64.9 GENDER DYSPHORIA: ICD-10-CM

## 2024-11-10 LAB
ALBUMIN SERPL BCG-MCNC: 4.5 G/DL (ref 3.5–5.2)
ALP SERPL-CCNC: 131 U/L (ref 40–150)
ALT SERPL W P-5'-P-CCNC: 53 U/L (ref 0–70)
ANION GAP SERPL CALCULATED.3IONS-SCNC: 11 MMOL/L (ref 7–15)
AST SERPL W P-5'-P-CCNC: 51 U/L (ref 0–45)
BILIRUB SERPL-MCNC: 0.3 MG/DL
BUN SERPL-MCNC: 15.7 MG/DL (ref 6–20)
CALCIUM SERPL-MCNC: 9.2 MG/DL (ref 8.8–10.4)
CHLORIDE SERPL-SCNC: 102 MMOL/L (ref 98–107)
CHOLEST SERPL-MCNC: 108 MG/DL
CREAT SERPL-MCNC: 0.78 MG/DL (ref 0.51–1.17)
EGFRCR SERPLBLD CKD-EPI 2021: >90 ML/MIN/1.73M2
ERYTHROCYTE [DISTWIDTH] IN BLOOD BY AUTOMATED COUNT: 11.9 % (ref 10–15)
EST. AVERAGE GLUCOSE BLD GHB EST-MCNC: 103 MG/DL
FASTING STATUS PATIENT QL REPORTED: ABNORMAL
FASTING STATUS PATIENT QL REPORTED: NORMAL
GLUCOSE SERPL-MCNC: 59 MG/DL (ref 70–99)
HBA1C MFR BLD: 5.2 % (ref 0–5.6)
HCO3 SERPL-SCNC: 26 MMOL/L (ref 22–29)
HCT VFR BLD AUTO: 39.3 % (ref 35–53)
HDLC SERPL-MCNC: 54 MG/DL
HGB BLD-MCNC: 12.7 G/DL (ref 11.7–17.7)
LDLC SERPL CALC-MCNC: 43 MG/DL
MCH RBC QN AUTO: 29.8 PG (ref 26.5–33)
MCHC RBC AUTO-ENTMCNC: 32.3 G/DL (ref 31.5–36.5)
MCV RBC AUTO: 92 FL (ref 78–100)
NONHDLC SERPL-MCNC: 54 MG/DL
PLATELET # BLD AUTO: 177 10E3/UL (ref 150–450)
POTASSIUM SERPL-SCNC: 3.8 MMOL/L (ref 3.4–5.3)
PROT SERPL-MCNC: 6.9 G/DL (ref 6.4–8.3)
RBC # BLD AUTO: 4.26 10E6/UL (ref 3.8–5.9)
SODIUM SERPL-SCNC: 139 MMOL/L (ref 135–145)
TRIGL SERPL-MCNC: 56 MG/DL
WBC # BLD AUTO: 3.6 10E3/UL (ref 4–11)

## 2024-11-10 PROCEDURE — 80061 LIPID PANEL: CPT

## 2024-11-10 PROCEDURE — 84403 ASSAY OF TOTAL TESTOSTERONE: CPT

## 2024-11-10 PROCEDURE — 83036 HEMOGLOBIN GLYCOSYLATED A1C: CPT

## 2024-11-10 PROCEDURE — 85027 COMPLETE CBC AUTOMATED: CPT

## 2024-11-10 PROCEDURE — 36415 COLL VENOUS BLD VENIPUNCTURE: CPT

## 2024-11-10 PROCEDURE — 80053 COMPREHEN METABOLIC PANEL: CPT

## 2024-11-11 ENCOUNTER — TELEPHONE (OUTPATIENT)
Dept: FAMILY MEDICINE | Facility: CLINIC | Age: 29
End: 2024-11-11
Payer: COMMERCIAL

## 2024-11-11 NOTE — TELEPHONE ENCOUNTER
Prior Authorization Retail Medication Request    Medication/Dose: Testosterone cypionate 100 soln  Diagnosis and ICD code (if different than what is on RX):    New/renewal/insurance change PA/secondary ins. PA:  Previously Tried and Failed:    Rationale:      Insurance   Primary: Paid/[employer]  Insurance ID:  [id number]     Secondary (if applicable):  Insurance ID:      Pharmacy Information (if different than what is on RX)  Name: [facility]   Phone:  [phone]  Fax:    Clinic Information  Preferred routing pool for dept communication:     Jie Mendoza  Pharmacy Technician   [facility]  [phone]

## 2024-11-12 LAB — TESTOST SERPL-MCNC: 452 NG/DL (ref 8–950)

## 2024-11-13 ENCOUNTER — MYC MEDICAL ADVICE (OUTPATIENT)
Dept: FAMILY MEDICINE | Facility: CLINIC | Age: 29
End: 2024-11-13
Payer: COMMERCIAL

## 2024-11-14 NOTE — TELEPHONE ENCOUNTER
Prior Authorization Approval    Authorization Effective Date: 10/15/2024  Authorization Expiration Date: 11/14/2025  Medication: Testosterone cypionate 100 soln  Approved Dose/Quantity:    Reference #:     Insurance Company: [employer] - Phone [phone] Fax [phone]  Expected CoPay:       CoPay Card Available:      Foundation Assistance Needed:    Which Pharmacy is filling the prescription (Not needed for infusion/clinic administered): [facility] - [place], [place] - [address] [place] [address]  Pharmacy Notified:  Yes  Patient Notified:  **Instructed pharmacy to notify patient when script is ready to /ship.**

## 2024-11-14 NOTE — TELEPHONE ENCOUNTER
Central Prior Authorization Team   Phone: [phone]    PA Initiation    Medication: Testosterone cypionate 100 soln  Insurance Company: [employer] - Phone [phone] Fax [phone]  Pharmacy Filling the Rx: [facility] - [place], [place] - [address] [facility]  Filling Pharmacy Phone: [phone]  Filling Pharmacy Fax:    Start Date: 11/14/2024

## 2024-11-14 NOTE — TELEPHONE ENCOUNTER
Dr. Dobson-Please review and advise.    Thank you!  ANTHONY Rodriguez, BSN, RN-BC  [facility]

## 2024-11-17 ENCOUNTER — MYC MEDICAL ADVICE (OUTPATIENT)
Dept: FAMILY MEDICINE | Facility: CLINIC | Age: 29
End: 2024-11-17
Payer: COMMERCIAL

## 2024-11-17 DIAGNOSIS — F64.9 GENDER DYSPHORIA: ICD-10-CM

## 2024-11-18 DIAGNOSIS — R94.5 ABNORMAL RESULTS OF LIVER FUNCTION STUDIES: Primary | ICD-10-CM

## 2024-11-19 NOTE — TELEPHONE ENCOUNTER
Dr. Dobson --    Please view [employer] message regarding testosterone question.     Thank you,   GIOVANY HuertaN RN  [facility]

## 2024-11-20 NOTE — TELEPHONE ENCOUNTER
Dr. Dobson: pt following up    I m really happy with the results I ve noticed from testosterone thus far and was wondering if I could increase my weekly dose?      I m a late jeannette and would love to see some changes quicker - specifically muscle mass growth and deepening of voice.     Could I double what I do now? Let me know what you think  ---  Hi Doctor - following up on this note here as today is my shot day - can I increase my dose? Please let me know. Thanks!     Betty MUÑOZ BSN, PHN, RN-[facility]  [phone]

## 2024-11-21 RX ORDER — TESTOSTERONE CYPIONATE 1000 MG/10ML
75 INJECTION, SOLUTION INTRAMUSCULAR WEEKLY
Qty: 10 ML | Refills: 1 | Status: SHIPPED | OUTPATIENT
Start: 2024-11-21

## 2024-11-25 ENCOUNTER — MYC MEDICAL ADVICE (OUTPATIENT)
Dept: FAMILY MEDICINE | Facility: CLINIC | Age: 29
End: 2024-11-25
Payer: COMMERCIAL

## 2024-11-25 ENCOUNTER — MYC REFILL (OUTPATIENT)
Dept: FAMILY MEDICINE | Facility: CLINIC | Age: 29
End: 2024-11-25
Payer: COMMERCIAL

## 2024-11-25 DIAGNOSIS — F90.9 ATTENTION DEFICIT HYPERACTIVITY DISORDER (ADHD), UNSPECIFIED ADHD TYPE: ICD-10-CM

## 2024-11-25 RX ORDER — ATOMOXETINE 40 MG/1
80 CAPSULE ORAL DAILY
Qty: 180 CAPSULE | Refills: 0 | OUTPATIENT
Start: 2024-11-25

## 2024-11-27 RX ORDER — ATOMOXETINE 40 MG/1
80 CAPSULE ORAL DAILY
Qty: 180 CAPSULE | Refills: 1 | Status: SHIPPED | OUTPATIENT
Start: 2024-11-27

## 2024-11-27 RX ORDER — ATOMOXETINE 40 MG/1
80 CAPSULE ORAL DAILY
Qty: 60 CAPSULE | Refills: 0 | Status: CANCELLED | OUTPATIENT
Start: 2024-11-27

## 2024-11-27 NOTE — TELEPHONE ENCOUNTER
Dr Dobson,    Her attached photo does show she only got #120 rather than #180 at last fill. She writes:    This morning i requested a refill of my atomoxetine prescription and it was denied and stated that it was too early to refill.     The problem is that on my file it says I got 180 count but I checked the bottle and only received 120 count. See images attached.     I m totally out of medication.     Can you please get this filled today so I can pick it up before the holiday?    RX queued up for #60    Laila GALAVIZ RN, BSN  [facility]

## 2024-12-19 ENCOUNTER — PATIENT OUTREACH (OUTPATIENT)
Dept: CARE COORDINATION | Facility: CLINIC | Age: 29
End: 2024-12-19
Payer: COMMERCIAL

## 2025-01-28 ENCOUNTER — MYC REFILL (OUTPATIENT)
Dept: FAMILY MEDICINE | Facility: CLINIC | Age: 30
End: 2025-01-28
Payer: COMMERCIAL

## 2025-01-28 DIAGNOSIS — L70.0 ACNE VULGARIS: ICD-10-CM

## 2025-01-29 ENCOUNTER — MYC MEDICAL ADVICE (OUTPATIENT)
Dept: FAMILY MEDICINE | Facility: CLINIC | Age: 30
End: 2025-01-29
Payer: COMMERCIAL

## 2025-01-29 RX ORDER — SULFACETAMIDE SODIUM, SULFUR 100; 50 MG/G; MG/G
1 EMULSION TOPICAL DAILY
Qty: 170 G | Refills: 0 | Status: SHIPPED | OUTPATIENT
Start: 2025-01-29

## 2025-01-29 RX ORDER — SULFACETAMIDE SODIUM, SULFUR 100; 50 MG/G; MG/G
1 EMULSION TOPICAL DAILY
Qty: 170 G | Refills: 3 | OUTPATIENT
Start: 2025-01-29

## 2025-01-30 NOTE — TELEPHONE ENCOUNTER
Dr. Dobson-Please review and advise.  Writer checked with [facility] staff and was informed they have met their limit for the month per MAYUR and cannot order more until February.  Perhaps patient should check back with pharmacy next week for ETA on shipment being received?      Thank you!  ANTHONY Rodriguez BSN, RN-[facility]

## 2025-02-24 ENCOUNTER — LAB (OUTPATIENT)
Dept: LAB | Facility: CLINIC | Age: 30
End: 2025-02-24
Payer: COMMERCIAL

## 2025-02-24 ENCOUNTER — OFFICE VISIT (OUTPATIENT)
Dept: FAMILY MEDICINE | Facility: CLINIC | Age: 30
End: 2025-02-24
Payer: COMMERCIAL

## 2025-02-24 VITALS
BODY MASS INDEX: 24.55 KG/M2 | OXYGEN SATURATION: 98 % | HEIGHT: 68 IN | TEMPERATURE: 98.4 F | DIASTOLIC BLOOD PRESSURE: 77 MMHG | SYSTOLIC BLOOD PRESSURE: 121 MMHG | WEIGHT: 162 LBS | RESPIRATION RATE: 16 BRPM | HEART RATE: 100 BPM

## 2025-02-24 DIAGNOSIS — F90.9 ATTENTION DEFICIT HYPERACTIVITY DISORDER (ADHD), UNSPECIFIED ADHD TYPE: ICD-10-CM

## 2025-02-24 DIAGNOSIS — F32.0 MILD MAJOR DEPRESSION: ICD-10-CM

## 2025-02-24 DIAGNOSIS — F64.9 GENDER DYSPHORIA: Primary | ICD-10-CM

## 2025-02-24 DIAGNOSIS — F41.1 GAD (GENERALIZED ANXIETY DISORDER): ICD-10-CM

## 2025-02-24 DIAGNOSIS — R94.5 ABNORMAL RESULTS OF LIVER FUNCTION STUDIES: ICD-10-CM

## 2025-02-24 PROCEDURE — 84450 TRANSFERASE (AST) (SGOT): CPT

## 2025-02-24 PROCEDURE — 99214 OFFICE O/P EST MOD 30 MIN: CPT | Performed by: STUDENT IN AN ORGANIZED HEALTH CARE EDUCATION/TRAINING PROGRAM

## 2025-02-24 PROCEDURE — 36415 COLL VENOUS BLD VENIPUNCTURE: CPT

## 2025-02-24 PROCEDURE — G2211 COMPLEX E/M VISIT ADD ON: HCPCS | Performed by: STUDENT IN AN ORGANIZED HEALTH CARE EDUCATION/TRAINING PROGRAM

## 2025-02-24 PROCEDURE — 84460 ALANINE AMINO (ALT) (SGPT): CPT

## 2025-02-24 RX ORDER — FINASTERIDE 1 MG/1
1 TABLET, FILM COATED ORAL DAILY
Qty: 90 TABLET | Refills: 3 | Status: SHIPPED | OUTPATIENT
Start: 2025-02-24

## 2025-02-24 RX ORDER — ATOMOXETINE 40 MG/1
80 CAPSULE ORAL DAILY
Qty: 180 CAPSULE | Refills: 3 | Status: SHIPPED | OUTPATIENT
Start: 2025-02-24

## 2025-02-24 RX ORDER — FLUOXETINE HYDROCHLORIDE 40 MG/1
CAPSULE ORAL
Qty: 90 CAPSULE | Refills: 3 | Status: SHIPPED | OUTPATIENT
Start: 2025-02-24

## 2025-02-24 NOTE — PROGRESS NOTES
Assessment & Plan     Gender dysphoria  Non-binary. Has been on T x 1 year. S/P top surgery. Has IUD for period suppression. Doing well on T 75mg weekly, shot day is Wednesday. Will recheck T level midcycle (ideally any Saturday). If stable, ok to space out visits Q6 months. Pt had very mild LFT elevation-rechecked today. Pt has questions about bottom growth. Unsure if finasteride suppresses bottom growth. Will discuss with my colleagues. If so, could consider stopping this.  - Testosterone total  - finasteride (PROPECIA) 1 MG tablet  Dispense: 90 tablet; Refill: 3    Attention deficit hyperactivity disorder (ADHD), unspecified ADHD type  Stable, refilled x 1 year.  - atomoxetine (STRATTERA) 40 MG capsule  Dispense: 180 capsule; Refill: 3    Mild major depression  JACLYN (generalized anxiety disorder)  Stable, refilled.   - FLUoxetine (PROZAC) 40 MG capsule  Dispense: 90 capsule; Refill: 3      The longitudinal plan of care for the diagnosis(es)/condition(s) as documented were addressed during this visit. Due to the added complexity in care, I will continue to support Joao in the subsequent management and with ongoing continuity of care.      Jorje Shepherd is a 30 year old, presenting for the following health issues:  Follow Up (Follow up for testosterone levels )        2/24/2025    10:04 AM   Additional Questions   Roomed by layton cardona     History of Present Illness       Reason for visit:  Testosterone check up    Joao eats 2-3 servings of fruits and vegetables daily.Joao consumes 0 sweetened beverage(s) daily.Joao exercises with enough effort to increase Joao's heart rate 60 or more minutes per day.  Joao exercises with enough effort to increase Joao's heart rate 7 days per week.   Joao is taking medications regularly.     Non-binary   Has been on T x 1 year.   S/P top surgery.    Gender follow up  -testosterone 75mg weekly (increased nov 2024)  -shot day-wednesday  -going well    Mild LFT  "elevation        Objective    /77   Pulse 100   Temp 98.4  F (36.9  C) (Temporal)   Resp 16   Ht 1.72 m (5' 7.72\")   Wt 73.5 kg (162 lb)   SpO2 98%   BMI 24.84 kg/m    Body mass index is 24.84 kg/m .    Physical Exam  Constitutional:       General: Joao is not in acute distress.     Appearance: Joao is not ill-appearing.   Pulmonary:      Effort: Pulmonary effort is normal.   Neurological:      General: No focal deficit present.      Mental Status: Joao is alert and oriented to person, place, and time.   Psychiatric:         Mood and Affect: Mood normal.         Behavior: Behavior normal.            Signed Electronically by: Baldev Dobson DO    "

## 2025-02-24 NOTE — PATIENT INSTRUCTIONS
Schedule a lab only visit anytime in the next 3 months to check T level. Come mid cycle (Saturday ideally)     Dr. Dobson

## 2025-02-25 LAB
ALT SERPL W P-5'-P-CCNC: 69 U/L (ref 0–70)
AST SERPL W P-5'-P-CCNC: 56 U/L (ref 0–45)

## 2025-04-24 ENCOUNTER — OFFICE VISIT (OUTPATIENT)
Dept: FAMILY MEDICINE | Facility: CLINIC | Age: 30
End: 2025-04-24
Payer: COMMERCIAL

## 2025-04-24 VITALS
RESPIRATION RATE: 16 BRPM | OXYGEN SATURATION: 100 % | TEMPERATURE: 98.1 F | DIASTOLIC BLOOD PRESSURE: 77 MMHG | HEIGHT: 68 IN | SYSTOLIC BLOOD PRESSURE: 124 MMHG | HEART RATE: 96 BPM | WEIGHT: 161.6 LBS | BODY MASS INDEX: 24.49 KG/M2

## 2025-04-24 DIAGNOSIS — N93.9 VAGINAL BLEEDING: ICD-10-CM

## 2025-04-24 DIAGNOSIS — R74.01 ELEVATED AST (SGOT): ICD-10-CM

## 2025-04-24 DIAGNOSIS — Z00.00 ENCOUNTER FOR MEDICAL EXAMINATION TO ESTABLISH CARE: Primary | ICD-10-CM

## 2025-04-24 DIAGNOSIS — F64.9 GENDER DYSPHORIA: ICD-10-CM

## 2025-04-24 DIAGNOSIS — F90.9 ATTENTION DEFICIT HYPERACTIVITY DISORDER (ADHD), UNSPECIFIED ADHD TYPE: ICD-10-CM

## 2025-04-24 PROCEDURE — 3074F SYST BP LT 130 MM HG: CPT

## 2025-04-24 PROCEDURE — 3078F DIAST BP <80 MM HG: CPT

## 2025-04-24 PROCEDURE — G2211 COMPLEX E/M VISIT ADD ON: HCPCS

## 2025-04-24 PROCEDURE — 99214 OFFICE O/P EST MOD 30 MIN: CPT | Mod: GC

## 2025-04-24 PROCEDURE — 1126F AMNT PAIN NOTED NONE PRSNT: CPT

## 2025-04-24 RX ORDER — FAMOTIDINE 20 MG
1 TABLET ORAL DAILY
COMMUNITY

## 2025-04-24 RX ORDER — TESTOSTERONE CYPIONATE 1000 MG/10ML
90 INJECTION, SOLUTION INTRAMUSCULAR WEEKLY
Qty: 120 ML | Refills: 0 | Status: SHIPPED | OUTPATIENT
Start: 2025-04-24

## 2025-04-24 RX ORDER — ATOMOXETINE 40 MG/1
80 CAPSULE ORAL DAILY
Qty: 180 CAPSULE | Refills: 3 | Status: SHIPPED | OUTPATIENT
Start: 2025-04-24

## 2025-04-24 ASSESSMENT — ANXIETY QUESTIONNAIRES
1. FEELING NERVOUS, ANXIOUS, OR ON EDGE: SEVERAL DAYS
3. WORRYING TOO MUCH ABOUT DIFFERENT THINGS: NOT AT ALL
IF YOU CHECKED OFF ANY PROBLEMS ON THIS QUESTIONNAIRE, HOW DIFFICULT HAVE THESE PROBLEMS MADE IT FOR YOU TO DO YOUR WORK, TAKE CARE OF THINGS AT HOME, OR GET ALONG WITH OTHER PEOPLE: SOMEWHAT DIFFICULT
6. BECOMING EASILY ANNOYED OR IRRITABLE: NOT AT ALL
5. BEING SO RESTLESS THAT IT IS HARD TO SIT STILL: SEVERAL DAYS
GAD7 TOTAL SCORE: 3
7. FEELING AFRAID AS IF SOMETHING AWFUL MIGHT HAPPEN: NOT AT ALL
GAD7 TOTAL SCORE: 3
7. FEELING AFRAID AS IF SOMETHING AWFUL MIGHT HAPPEN: NOT AT ALL
8. IF YOU CHECKED OFF ANY PROBLEMS, HOW DIFFICULT HAVE THESE MADE IT FOR YOU TO DO YOUR WORK, TAKE CARE OF THINGS AT HOME, OR GET ALONG WITH OTHER PEOPLE?: SOMEWHAT DIFFICULT
4. TROUBLE RELAXING: SEVERAL DAYS
GAD7 TOTAL SCORE: 3
2. NOT BEING ABLE TO STOP OR CONTROL WORRYING: NOT AT ALL

## 2025-04-24 ASSESSMENT — PATIENT HEALTH QUESTIONNAIRE - PHQ9
SUM OF ALL RESPONSES TO PHQ QUESTIONS 1-9: 3
10. IF YOU CHECKED OFF ANY PROBLEMS, HOW DIFFICULT HAVE THESE PROBLEMS MADE IT FOR YOU TO DO YOUR WORK, TAKE CARE OF THINGS AT HOME, OR GET ALONG WITH OTHER PEOPLE: NOT DIFFICULT AT ALL
SUM OF ALL RESPONSES TO PHQ QUESTIONS 1-9: 3

## 2025-04-24 ASSESSMENT — PAIN SCALES - GENERAL: PAINLEVEL_OUTOF10: NO PAIN (0)

## 2025-04-24 NOTE — PATIENT INSTRUCTIONS
"Patient Education   Here is the plan from today's visit    1. Encounter for medical examination to establish care (Primary)  Great to meet you today, Joao! Please call or message over [employer] with questions or concerns.     2. Gender dysphoria  Increased dose to 90mg from 75mg today. New injection needle. Still once per week, now gluteal injection. Let's try to switch injection day to Tuesday or Friday to make sure we have options for lab draws that aren't over the weekend.   - testosterone cypionate (DEPOTESTOSTERONE) 100 MG/ML injection; Inject 0.9 mLs (90 mg) into the muscle once a week.  Dispense: 120 mL; Refill: 0  - needle, disp, 18G X 1\" MISC; Use to draw up weekly injection  Dispense: 25 each; Refill: 11  - Needle, Disp, 25G X 1-1/2\" MISC; 1 each once a week.  Dispense: 25 each; Refill: 11    3. Elevated AST (SGOT)  We will check liver labs next time we check T and hemoglobin level in 3 months (July). This is a VERY mild elevation, near normal, not something I am concerned about. Mostly I want to make sure the increase in T doesn't make the liver lab way more elevated.    4. Vaginal bleeding  Let's plan on a pelvic exam next visit if possible to make sure there aren't any causes for bleeding (things I am thinking about are infection, bleeding spots in vagina/on cervix, dry tissue, etc). If all seems healthy, we can plan for IUD removal and see if IUD removal plus increasing T will help with stopping breakthrough bleeding. More to come on this at next visit.    5. Attention deficit hyperactivity disorder (ADHD), unspecified ADHD type  Refill  - atomoxetine (STRATTERA) 40 MG capsule; Take 2 capsules (80 mg) by mouth daily.  Dispense: 180 capsule; Refill: 3      Please call or return to clinic if your symptoms don't go away.    Follow up plan  Return in about 4 weeks (around 5/22/2025) for Follow up.    Thank you for coming to [facility] today.  Lab Testing:  **If you had lab testing today and your " results are reassuring or normal they will be mailed to you or sent through [employer] within 7 days.   **If the lab tests need quick action we will call you with the results.  **If you are having labs done on a different day, please call [phone] to schedule at [facility] or [phone] for other [facility] Outpatient Lab locations. Labs do not offer walk-in appointments.  The phone number we will call with results is # [phone] (home) . If this is not the best number please call our clinic and change the number.  Medication Refills:  If you need any refills please call your pharmacy and they will contact us.   If you need to  your refill at a new pharmacy, please contact the new pharmacy directly. The new pharmacy will help you get your medications transferred faster.   Scheduling:  If you have any concerns about today's visit or wish to schedule another appointment please call our office during normal business hours [phone] (8-5:00 M-F). If you can no longer make a scheduled visit, please cancel via [employer] or call us to cancel.   If a referral was made to an [facility] specialty provider and you do not get a call from central scheduling, please refer to directions on your visit summary or call our office during normal business hours for assistance.   If a Mammogram was ordered for you at the [facility] call [phone] to schedule or change your appointment.  If you had an XRay/CT/Ultrasound/MRI ordered the number is [phone] to schedule or change your radiology appointment.   [facility] has limited ultrasound appointments available on Wednesdays, if you would like your ultrasound at [facility], please call [phone] to schedule.   Medical Concerns:  If you have urgent medical concerns please call [phone] at any time of the day.    Cordelia Cosme MD

## 2025-04-24 NOTE — PROGRESS NOTES
"  Assessment & Plan     The longitudinal plan of care for the diagnosis(es)/condition(s) as documented were addressed during this visit. Due to the added complexity in care, I will continue to support Joao in the subsequent management and with ongoing continuity of care.    Encounter for medical examination to establish care  PMH, allergies, medications, SH, sexual hx, and surgical hx.     Gender dysphoria  Elevated AST  Has been on testosterone cypionate since 10/2023, last dose increase to 75mg on 11/21/24. Would like to increase dose to continue toward embodiment goals and switch to IM injection from subcutaneous due to injection site soreness in abdomen. Last testosterone and hemoglobin level on 11/2024 within goal/safe range. Has elevated AST to 56 at last check, 51 prior check, mildly elevated. Considered T side effect vs alcohol-related steatohepatitis given alcohol use disorder history. Given mild elevation and otherwise normal labs and exam, will continue to trend with T labs to monitor for significant elevation with T dose increase.  - testosterone cypionate (DEPOTESTOSTERONE) 100 MG/ML injection; Inject 0.9 mLs (90 mg) into the muscle once a week.  - needle, disp, 18G X 1\" MISC; Use to draw up weekly injection  - Needle, Disp, 25G X 1-1/2\" MISC; 1 each once a week.    Vaginal bleeding  Reports spotting while on T, increased/worse after intercourse. Has had mirena IUD since 9/2023. No chance of pregnancy. Considered vaginitis, vaginal tissue atrophy, cyclic breakthrough bleeding on mirena vs IUD side effect. Patient would like IUD removed if possible as only has for prevention of vaginal bleeding. Discussed with patient that recommendation is for pelvic exam at next visit to assess for cause of irritation, lesions, infection, atrophy as cause of bleeding. If exam without sign of cause of bleeding, consider IUD removal and monitoring for decrease in bleeding with increased dose of T and IUD removal. " "    Attention deficit hyperactivity disorder (ADHD), unspecified ADHD type  Has been on stable dose since at least 2021 per chart review for ADHD diagnosed in childhood. Not on stimulants given substance use disorder history per patient report. Refill provided.  - atomoxetine (STRATTERA) 40 MG capsule; Take 2 capsules (80 mg) by mouth daily.    Follow-up  Return in about 4 weeks (around 5/22/2025) for Follow up.    Jorje Shepherd is a 30 year old, presenting for the following health issues:  Establish Care      4/24/2025    11:13 AM   Additional Questions   Roomed by Joselito   Accompanied by Self         4/24/2025    Information    services provided? No     HPI    Blood test (overdue for few months)  Did shot yesterday  0.75mg subcutaneous weekly - wondering about switching to IM  Last increased one year ago  Would like to increase dose  Fat redistribution  Muscle increase  Voice change  IUD   Would like to have taken out if not necessary  If not going to get period on T would like to take out  Bleeding after sex with intercourse  Finasteride for preventative for hair loss  Has not had hair loss on testosterone  Liver enzymes off on last tests  Associated with lifting weights      Review of Systems  CONSTITUTIONAL: NEGATIVE for fever, chills, change in weight  ENT/MOUTH: NEGATIVE for ear, mouth and throat problems  RESP: NEGATIVE for significant cough or SOB  CV: NEGATIVE for chest pain, palpitations or peripheral edema      Objective    /77 (BP Location: Left arm, Patient Position: Sitting, Cuff Size: Adult Regular)   Pulse 96   Temp 98.1  F (36.7  C) (Oral)   Resp 16   Ht 1.727 m (5' 8\")   Wt 73.3 kg (161 lb 9.6 oz)   LMP 01/01/2023 (Approximate)   SpO2 100%   BMI 24.57 kg/m    Body mass index is 24.57 kg/m .  Physical Exam   GENERAL: alert and no distress  NECK: no adenopathy, no asymmetry, masses, or scars  RESP: speaking in full sentences, normal work of breathing  CV: " regular rate  MS: no gross musculoskeletal defects noted, no edema  Psych: alert, oriented x4, normal mood and affect          Signed Electronically by: Cordelia Cosme MD    Answers submitted by the patient for this visit:  Patient Health Questionnaire (Submitted on 4/24/2025)  If you checked off any problems, how difficult have these problems made it for you to do your work, take care of things at home, or get along with other people?: Not difficult at all  PHQ9 TOTAL SCORE: 3  Patient Health Questionnaire (G7) (Submitted on 4/24/2025)  JACLYN 7 TOTAL SCORE: 3

## 2025-04-28 NOTE — PROGRESS NOTES
Preceptor Attestation:   Patient seen, evaluated and discussed with the resident. I have verified the content of the note, which accurately reflects my assessment of the patient and the plan of care.   Supervising Physician:  Fermin Rdz MD

## 2025-05-11 ENCOUNTER — HEALTH MAINTENANCE LETTER (OUTPATIENT)
Age: 30
End: 2025-05-11

## 2025-07-14 ENCOUNTER — APPOINTMENT (OUTPATIENT)
Dept: URBAN - METROPOLITAN AREA CLINIC 256 | Age: 30
Setting detail: DERMATOLOGY
End: 2025-07-14

## 2025-07-14 VITALS — HEIGHT: 68 IN | WEIGHT: 160 LBS

## 2025-07-14 DIAGNOSIS — L91.8 OTHER HYPERTROPHIC DISORDERS OF THE SKIN: ICD-10-CM

## 2025-07-14 DIAGNOSIS — Z71.89 OTHER SPECIFIED COUNSELING: ICD-10-CM

## 2025-07-14 DIAGNOSIS — Z79.899 OTHER LONG TERM (CURRENT) DRUG THERAPY: ICD-10-CM

## 2025-07-14 DIAGNOSIS — L70.0 ACNE VULGARIS: ICD-10-CM

## 2025-07-14 DIAGNOSIS — L57.8 OTHER SKIN CHANGES DUE TO CHRONIC EXPOSURE TO NONIONIZING RADIATION: ICD-10-CM

## 2025-07-14 DIAGNOSIS — D18.0 HEMANGIOMA: ICD-10-CM

## 2025-07-14 DIAGNOSIS — D22 MELANOCYTIC NEVI: ICD-10-CM

## 2025-07-14 PROBLEM — D18.01 HEMANGIOMA OF SKIN AND SUBCUTANEOUS TISSUE: Status: ACTIVE | Noted: 2025-07-14

## 2025-07-14 PROBLEM — D22.5 MELANOCYTIC NEVI OF TRUNK: Status: ACTIVE | Noted: 2025-07-14

## 2025-07-14 PROCEDURE — OTHER PHOTO-DOCUMENTATION: OTHER

## 2025-07-14 PROCEDURE — OTHER SUNSCREEN RECOMMENDATIONS: OTHER

## 2025-07-14 PROCEDURE — 36415 COLL VENOUS BLD VENIPUNCTURE: CPT

## 2025-07-14 PROCEDURE — OTHER VENIPUNCTURE: OTHER

## 2025-07-14 PROCEDURE — OTHER MIPS QUALITY: OTHER

## 2025-07-14 PROCEDURE — OTHER ORDER TESTS: OTHER

## 2025-07-14 PROCEDURE — 81025 URINE PREGNANCY TEST: CPT

## 2025-07-14 PROCEDURE — 99214 OFFICE O/P EST MOD 30 MIN: CPT

## 2025-07-14 PROCEDURE — OTHER PATIENT SPECIFIC COUNSELING: OTHER

## 2025-07-14 PROCEDURE — OTHER EDUCATIONAL RESOURCES PROVIDED: OTHER

## 2025-07-14 PROCEDURE — OTHER URINE PREGNANCY TEST: OTHER

## 2025-07-14 PROCEDURE — OTHER ADDITIONAL NOTES: OTHER

## 2025-07-14 PROCEDURE — OTHER HIGH RISK MEDICATION MONITORING: OTHER

## 2025-07-14 PROCEDURE — OTHER COUNSELING: OTHER

## 2025-07-14 PROCEDURE — OTHER ISOTRETINOIN INITIATION: OTHER

## 2025-07-14 ASSESSMENT — LOCATION ZONE DERM
LOCATION ZONE: EYELID
LOCATION ZONE: ARM
LOCATION ZONE: FACE
LOCATION ZONE: TRUNK

## 2025-07-14 ASSESSMENT — LOCATION DETAILED DESCRIPTION DERM
LOCATION DETAILED: RIGHT INFERIOR CENTRAL MALAR CHEEK
LOCATION DETAILED: MIDDLE STERNUM
LOCATION DETAILED: INFERIOR MID FOREHEAD
LOCATION DETAILED: LEFT INFERIOR CENTRAL MALAR CHEEK
LOCATION DETAILED: EPIGASTRIC SKIN
LOCATION DETAILED: RIGHT ANTECUBITAL SKIN
LOCATION DETAILED: RIGHT LATERAL INFERIOR EYELID

## 2025-07-14 ASSESSMENT — LOCATION SIMPLE DESCRIPTION DERM
LOCATION SIMPLE: LEFT CHEEK
LOCATION SIMPLE: RIGHT UPPER ARM
LOCATION SIMPLE: RIGHT INFERIOR EYELID
LOCATION SIMPLE: CHEST
LOCATION SIMPLE: ABDOMEN
LOCATION SIMPLE: RIGHT CHEEK
LOCATION SIMPLE: INFERIOR FOREHEAD

## 2025-07-22 ENCOUNTER — OFFICE VISIT (OUTPATIENT)
Dept: FAMILY MEDICINE | Facility: CLINIC | Age: 30
End: 2025-07-22
Payer: COMMERCIAL

## 2025-07-22 VITALS
DIASTOLIC BLOOD PRESSURE: 75 MMHG | RESPIRATION RATE: 16 BRPM | SYSTOLIC BLOOD PRESSURE: 110 MMHG | OXYGEN SATURATION: 100 % | HEART RATE: 84 BPM | BODY MASS INDEX: 24.57 KG/M2 | TEMPERATURE: 97.6 F | HEIGHT: 68 IN

## 2025-07-22 DIAGNOSIS — N93.9 VAGINAL BLEEDING: ICD-10-CM

## 2025-07-22 DIAGNOSIS — F64.9 GENDER DYSPHORIA: Primary | ICD-10-CM

## 2025-07-22 DIAGNOSIS — L70.0 ACNE VULGARIS: ICD-10-CM

## 2025-07-22 DIAGNOSIS — Z51.81 MEDICATION MONITORING ENCOUNTER: ICD-10-CM

## 2025-07-22 RX ORDER — SYRINGE, DISPOSABLE, 1 ML
SYRINGE, EMPTY DISPOSABLE MISCELLANEOUS
Qty: 25 EACH | Refills: 3 | Status: SHIPPED | OUTPATIENT
Start: 2025-07-22

## 2025-07-22 RX ORDER — SALICYLIC ACID 60 MG/G
GEL TOPICAL
Qty: 40 G | Refills: 3 | Status: SHIPPED | OUTPATIENT
Start: 2025-07-22

## 2025-07-22 RX ORDER — ESTRADIOL 0.1 MG/G
2 CREAM VAGINAL
Qty: 2 G | Refills: 0 | Status: SHIPPED | OUTPATIENT
Start: 2025-07-22

## 2025-07-22 RX ORDER — BENZOYL PEROXIDE 10 G/100G
SUSPENSION TOPICAL 2 TIMES DAILY
Qty: 156 G | Refills: 3 | Status: SHIPPED | OUTPATIENT
Start: 2025-07-22

## 2025-07-22 NOTE — PROGRESS NOTES
Preceptor Attestation:   Patient seen, evaluated and discussed with the resident. I have verified the content of the note, which accurately reflects my assessment of the patient and the plan of care.   Supervising Physician:  Jeannie Mcbride MD

## 2025-07-22 NOTE — PATIENT INSTRUCTIONS
Patient Education   Here is the plan from today's visit    1. Gender dysphoria (Primary)  We can increase the testosterone dose to help with the bleeding if labs come back within acceptable range. Ultrasound of pelvis to assess for increase uterine lining or other anatomical change that could be leading to more bleeding.  - syringe, disposable, (B-D SYRINGE LUER-MARGARITA) 1 ML MISC; To use with weekly injections  Dispense: 25 each; Refill: 3  - estradiol (ESTRACE) 0.1 MG/GM vaginal cream; Place 2 g vaginally once as needed (prior to pelvic procedure/exam).  Dispense: 2 g; Refill: 0  - Testosterone total; Future  - Hemoglobin; Future  - US Pelvic Complete w Transvaginal; Future    2. Acne vulgaris  Try the benzoyl wash daily (can increase to twice daily). Salicylic acid gel daily.   - benzoyl peroxide (PANOXYL) 10 % external liquid; Apply topically 2 times daily.  Dispense: 156 g; Refill: 3  - salicylic acid 6 % external gel; Joao Morales  Dispense: 40 g; Refill: 3    3. Vaginal bleeding  - US Pelvic Complete w Transvaginal; Future    4. Medication monitoring encounter  - AST; Future  - ALT; Future    Please call or return to clinic if your symptoms don't go away.    Follow up plan  No follow-ups on file.    Thank you for coming to [facility] today.  Lab Testing:  **If you had lab testing today and your results are reassuring or normal they will be mailed to you or sent through [employer] within 7 days.   **If the lab tests need quick action we will call you with the results.  **If you are having labs done on a different day, please call [phone] to schedule at [facility] or [phone] for other [facility] locations. Labs do not offer walk-in appointments.  The phone number we will call with results is # [phone] (home) . If this is not the best number please call our clinic and change the number.  Medication Refills:  If you need any refills please call your pharmacy and they will  contact us.   If you need to  your refill at a new pharmacy, please contact the new pharmacy directly. The new pharmacy will help you get your medications transferred faster.   Scheduling:  If you have any concerns about today's visit or wish to schedule another appointment please call our office during normal business hours [phone] (8-5:00 M-F). If you can no longer make a scheduled visit, please cancel via [employer] or call us to cancel.   If a referral was made to an [facility] specialty provider and you do not get a call from central scheduling, please refer to directions on your visit summary or call our office during normal business hours for assistance.   If a Mammogram was ordered for you at the Breast Center call [phone] to schedule or change your appointment.  If you had an XRay/CT/Ultrasound/MRI ordered the number is [phone] to schedule or change your radiology appointment.   [facility] has limited ultrasound appointments available on Wednesdays, if you would like your ultrasound at [facility], please call [phone] to schedule.   Medical Concerns:  If you have urgent medical concerns please call [phone] at any time of the day.    Cordelia Cosme MD

## 2025-07-22 NOTE — PROGRESS NOTES
Assessment & Plan     The longitudinal plan of care for the diagnosis(es)/condition(s) as documented were addressed during this visit. Due to the added complexity in care, I will continue to support Joao in the subsequent management and with ongoing continuity of care.    Gender dysphoria  Vaginal bleeding  Intermittent spotting lasting one day at a time over the past several months to year. IUD in place since September 2023 and was having regular menses after IUD placed. Increased dose of testosterone to 90mg 2 months ago with some improvement in frequency of bleeding. Differential includes endometrial hyperplasia in setting of IUD vs inadequate suppression of menses despite testosterone vs intrauterine polyp or fibroid. Ordered pelvic ultrasound to assess for endometrial hyperplasia or other anatomic abnormality and prescribed estradiol cream to be used the day before the visit to decrease risk of discomfort. Also discussed trial of NSAIDs taken immediately after they notice spotting has begun to reduce bleeding, but cautioned against using them too frequently. Checking testosterone mid-cycle and will consider testosterone dose increase for menstrual suppression and furthering embodiment goals.   - syringe, disposable, (B-D SYRINGE LUER-MARGARITA) 1 ML MISC  Dispense: 25 each; Refill: 3  - US Pelvic Complete w Transvaginal  - estradiol (ESTRACE) 0.1 MG/GM vaginal cream  Dispense: 2 g; Refill: 0  - Testosterone total  - Hemoglobin    Acne vulgaris  Patient reports acne on face (particularly jawline) and back. Not on any treatments. Worsening with testosterone therapy.  -benzoyl peroxide wash 1-2 times daily  -salicylic acid on face daily  -consider additional of clindamycin topical vs tretinoin if not improved    Follow-up   Follow up with lab-only appt on a Friday (mid-way between your shots).  You will get a call to schedule a pelvic ultrasound.    Jorje Shepherd is a 30 year old, presenting for the following  "health issues:  RECHECK (testosterone levels, ultrasound)        7/22/2025     1:52 PM   Additional Questions   Roomed by nou   Accompanied by girlfriend         7/22/2025    Information    services provided? No     HPI       Stopped taking finasteride 1 month ago - had been taking to prevent hair loss on T  Bleeding  3 times since last visit (2 months)  Spotting for 1 day   No pain or cramping   Happens after sex, not predictable  Testosterone  0.9 mL/90mg now, once per week  Energy and sleep are good   Starting to get facial hair and hair on stomach - affirming  No unwanted effects  Injecting in glute  Today is shot day! Has not taken shot yet  Acne - jaw line, back; currently doing sensitive facial wash daily       Review of Systems  Constitutional, HEENT, cardiovascular, pulmonary, gi and gu systems are negative, except as otherwise noted.      Objective    /75   Pulse 84   Temp 97.6  F (36.4  C) (Temporal)   Resp 16   Ht 1.727 m (5' 8\")   SpO2 100%   BMI 24.57 kg/m    Body mass index is 24.57 kg/m .  Physical Exam  Vitals reviewed.   Constitutional:       General: Joao is not in acute distress.  Cardiovascular:      Rate and Rhythm: Normal rate and regular rhythm.   Pulmonary:      Effort: Pulmonary effort is normal.   Musculoskeletal:      Cervical back: Normal range of motion.   Neurological:      General: No focal deficit present.      Mental Status: Joao is alert.   Psychiatric:         Mood and Affect: Mood normal.         Thought Content: Thought content normal.         Judgment: Judgment normal.        GENERAL: alert and no distress  NECK: no asymmetry, masses, or scars  RESP: normal work of breathing, speaking in full sentences  CV: regular rate, no peripheral edema  ABDOMEN: non-distended  MS: no gross musculoskeletal defects noted, no edema  NEURO: no focal deficits  PSYCH: normal affect          Keri Garcia, MS3     Resident/Fellow Attestation   Cordelia ARAYA " MD Carmelina, was present with the medical/DANY student who participated in the service and in the documentation of the note.  I have verified the history and personally performed the physical exam and medical decision making.  I agree with the assessment and plan of care as documented in the note.      Cordelia Cosme MD  PGY2    Signed Electronically by: Cordelia Cosme MD

## 2025-07-30 ENCOUNTER — ANCILLARY PROCEDURE (OUTPATIENT)
Dept: ULTRASOUND IMAGING | Facility: CLINIC | Age: 30
End: 2025-07-30
Payer: COMMERCIAL

## 2025-07-30 DIAGNOSIS — N93.9 VAGINAL BLEEDING: ICD-10-CM

## 2025-07-30 DIAGNOSIS — F64.9 GENDER DYSPHORIA: ICD-10-CM

## 2025-07-30 PROCEDURE — 76830 TRANSVAGINAL US NON-OB: CPT | Performed by: RADIOLOGY

## 2025-07-30 PROCEDURE — 76856 US EXAM PELVIC COMPLETE: CPT | Performed by: RADIOLOGY

## 2025-08-14 ENCOUNTER — APPOINTMENT (OUTPATIENT)
Dept: URBAN - METROPOLITAN AREA CLINIC 256 | Age: 30
Setting detail: DERMATOLOGY
End: 2025-08-14

## 2025-08-14 VITALS — WEIGHT: 160 LBS | HEIGHT: 67 IN

## 2025-08-14 DIAGNOSIS — Z79.899 OTHER LONG TERM (CURRENT) DRUG THERAPY: ICD-10-CM

## 2025-08-14 DIAGNOSIS — L70.0 ACNE VULGARIS: ICD-10-CM

## 2025-08-14 PROCEDURE — OTHER MIPS QUALITY: OTHER

## 2025-08-14 PROCEDURE — OTHER ISOTRETINOIN INITIATION: OTHER

## 2025-08-14 PROCEDURE — 99214 OFFICE O/P EST MOD 30 MIN: CPT

## 2025-08-14 PROCEDURE — 81025 URINE PREGNANCY TEST: CPT

## 2025-08-14 PROCEDURE — OTHER PRESCRIPTION MEDICATION MANAGEMENT: OTHER

## 2025-08-14 PROCEDURE — OTHER HIGH RISK MEDICATION MONITORING: OTHER

## 2025-08-14 PROCEDURE — OTHER URINE PREGNANCY TEST: OTHER

## 2025-08-14 PROCEDURE — OTHER PRESCRIPTION: OTHER

## 2025-08-14 PROCEDURE — OTHER ADDITIONAL NOTES: OTHER

## 2025-08-14 PROCEDURE — OTHER COUNSELING: OTHER

## 2025-08-14 RX ORDER — ISOTRETINOIN 40 MG/1
CAPSULE, LIQUID FILLED ORAL TWICE PER DAY
Qty: 60 | Refills: 0 | Status: ERX | COMMUNITY
Start: 2025-08-14

## 2025-08-14 ASSESSMENT — LOCATION SIMPLE DESCRIPTION DERM
LOCATION SIMPLE: LEFT CHEEK
LOCATION SIMPLE: RIGHT CHEEK

## 2025-08-14 ASSESSMENT — LOCATION ZONE DERM: LOCATION ZONE: FACE

## 2025-08-14 ASSESSMENT — LOCATION DETAILED DESCRIPTION DERM
LOCATION DETAILED: RIGHT INFERIOR CENTRAL MALAR CHEEK
LOCATION DETAILED: LEFT INFERIOR CENTRAL MALAR CHEEK

## 2025-09-02 ENCOUNTER — RX ONLY (RX ONLY)
Age: 30
End: 2025-09-02

## 2025-09-02 ENCOUNTER — APPOINTMENT (OUTPATIENT)
Dept: URBAN - METROPOLITAN AREA CLINIC 256 | Age: 30
Setting detail: DERMATOLOGY
End: 2025-09-02

## 2025-09-02 DIAGNOSIS — Z79.899 OTHER LONG TERM (CURRENT) DRUG THERAPY: ICD-10-CM

## 2025-09-02 PROCEDURE — OTHER MIPS QUALITY: OTHER

## 2025-09-02 PROCEDURE — 81025 URINE PREGNANCY TEST: CPT

## 2025-09-02 PROCEDURE — OTHER PATIENT SPECIFIC COUNSELING: OTHER

## 2025-09-02 PROCEDURE — OTHER URINE PREGNANCY TEST: OTHER

## 2025-09-02 RX ORDER — ISOTRETINOIN 40 MG/1
CAPSULE, LIQUID FILLED ORAL TWICE PER DAY
Qty: 60 | Refills: 0 | Status: CANCELLED
Stop reason: SDUPTHER

## 2025-09-02 RX ORDER — ISOTRETINOIN 40 MG/1
CAPSULE, LIQUID FILLED ORAL TWICE PER DAY
Qty: 60 | Refills: 0 | Status: ERX | COMMUNITY
Start: 2025-09-02